# Patient Record
Sex: FEMALE | Race: BLACK OR AFRICAN AMERICAN | NOT HISPANIC OR LATINO | Employment: FULL TIME | URBAN - METROPOLITAN AREA
[De-identification: names, ages, dates, MRNs, and addresses within clinical notes are randomized per-mention and may not be internally consistent; named-entity substitution may affect disease eponyms.]

---

## 2023-05-17 ENCOUNTER — OFFICE VISIT (OUTPATIENT)
Dept: URGENT CARE | Facility: CLINIC | Age: 63
End: 2023-05-17
Payer: MEDICAID

## 2023-05-17 VITALS
TEMPERATURE: 98 F | DIASTOLIC BLOOD PRESSURE: 82 MMHG | SYSTOLIC BLOOD PRESSURE: 138 MMHG | WEIGHT: 197 LBS | HEIGHT: 64 IN | HEART RATE: 65 BPM | BODY MASS INDEX: 33.63 KG/M2 | RESPIRATION RATE: 16 BRPM | OXYGEN SATURATION: 96 %

## 2023-05-17 DIAGNOSIS — S70.362A INSECT BITE OF LEFT THIGH, INITIAL ENCOUNTER: Primary | ICD-10-CM
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
## 2023-05-17 PROBLEM — E78.00 PURE HYPERCHOLESTEROLEMIA: Status: ACTIVE | Noted: 2018-12-11

## 2023-05-17 PROBLEM — K64.8 INTERNAL HEMORRHOIDS: Status: ACTIVE | Noted: 2021-01-14

## 2023-05-17 PROCEDURE — 99203 OFFICE O/P NEW LOW 30 MIN: CPT | Mod: S$GLB,,,

## 2023-05-17 PROCEDURE — 99203 PR OFFICE/OUTPT VISIT, NEW, LEVL III, 30-44 MIN: ICD-10-PCS | Mod: S$GLB,,,

## 2023-05-17 RX ORDER — AMLODIPINE BESYLATE 10 MG/1
TABLET ORAL
COMMUNITY

## 2023-05-17 RX ORDER — HYDROCORTISONE 25 MG/G
CREAM TOPICAL 2 TIMES DAILY
Qty: 3.5 G | Refills: 0 | Status: SHIPPED | OUTPATIENT
Start: 2023-05-17 | End: 2024-01-15

## 2023-05-17 RX ORDER — FAMOTIDINE 20 MG/1
20 TABLET, FILM COATED ORAL 2 TIMES DAILY
Qty: 60 TABLET | Refills: 11 | Status: SHIPPED | OUTPATIENT
Start: 2023-05-17 | End: 2024-01-15 | Stop reason: CLARIF

## 2023-05-17 RX ORDER — SERTRALINE HYDROCHLORIDE 50 MG/1
TABLET, FILM COATED ORAL
COMMUNITY

## 2023-05-17 RX ORDER — METFORMIN HYDROCHLORIDE 500 MG/1
TABLET ORAL
COMMUNITY

## 2023-05-17 RX ORDER — CETIRIZINE HYDROCHLORIDE 10 MG/1
10 TABLET ORAL 2 TIMES DAILY
Qty: 60 TABLET | Refills: 0 | Status: SHIPPED | OUTPATIENT
Start: 2023-05-17 | End: 2024-01-15

## 2023-05-17 NOTE — PROGRESS NOTES
"Subjective:      Patient ID: Rivka Ivy is a 63 y.o. female.    Vitals:  height is 5' 4" (1.626 m) and weight is 89.4 kg (197 lb). Her oral temperature is 98.1 °F (36.7 °C). Her blood pressure is 138/82 and her pulse is 65. Her respiration is 16 and oxygen saturation is 96%.     Chief Complaint: Insect Bite    Patient states that something bit her on mother's day. She states that it is very itchy. She has tried cortisone cream and topical abx ointment with mild relief. She denies fever, chills, CP, SOB, nausea, vomiting.    Insect Bite  This is a new problem. The current episode started in the past 7 days. The problem occurs constantly. The problem has been unchanged. Pertinent negatives include no abdominal pain, chest pain, chills, congestion, coughing, diaphoresis, fatigue, fever, nausea, visual change or vomiting. Treatments tried: anti-itch cream. The treatment provided no relief.     Constitution: Negative for chills, sweating, fatigue and fever.   HENT:  Negative for congestion.    Cardiovascular:  Negative for chest pain and sob on exertion.   Respiratory:  Negative for cough.    Gastrointestinal:  Negative for abdominal pain, nausea, vomiting and diarrhea.   Skin:  Positive for erythema. Negative for hives.   Allergic/Immunologic: Positive for itching. Negative for hives.    Objective:     Physical Exam   Constitutional:  Non-toxic appearance. She does not appear ill. No distress.      Comments:Patient is in no acute distress.   normal  HENT:   Head: Normocephalic.   Pulmonary/Chest: Effort normal. No respiratory distress.         Comments: Patient is in no respiratory distress. No accessory muscle usage. Patient able to speak in complete sentences with ease.    Abdominal: Normal appearance.   Neurological: She is alert.   Skin: Skin is not diaphoretic. erythema         Comments: There is an area of erythema appreciated on the medial aspect of the lower left thigh. There area is slightly warm but without " tenderness to palpation. There is no area of fluctuance, no area of induration, no discharge, no warmth appreciated on exam.     Nursing note and vitals reviewed.    Media Information      Assessment:     1. Insect bite of left thigh, initial encounter      Plan:   Previous notes reviewed.  Vital signs reviewed.  Discussed mild allergic reaction due to insect bite, home care, tx options, and given follow up precautions.  Patient was briefed on my thought process and diagnosis.   Patient involved with the treatment plan and agreed to the plan.  Patient informed on warning signs, patient understood warning signs and to go to urgent care or ER if warning signs appear.    Will continue to treat for localized skin reaction due to insect bite, cellulitis appears to be less likely at this time, will have patient try anti-histamines and topical anti-inflammatory medication to help with symptoms, patient informed that if her symptoms are not improving and/or worsening with these remedies within the next 48-72 hours to return to the urgent care.    Patient Instructions   Please drink plenty of fluids.  Please get plenty of rest.  Please return here or go to the Emergency Department for any concerns or worsening of condition.    Please take the cetrizine (Zyrtec) 10mg two times a day for hives relief.  You can increase your cetrizine (Zyrtec) to 20mg two times a day if the 10mg two times a day is ineffective.  Please take the famotidine (Pepcid) 20mg once a day for hives relief.   It is acceptable to take the Zyrtec and Pepcid, in the event that your medications overlap.     Please apply HYDROCORTISONE cream to the affected area.    Please follow up with your primary care doctor or specialist as needed.    If you  smoke, please stop smoking.     Insect bite of left thigh, initial encounter  -     cetirizine (ZYRTEC) 10 MG tablet; Take 1 tablet (10 mg total) by mouth 2 (two) times a day.  Dispense: 60 tablet; Refill: 0  -      famotidine (PEPCID) 20 MG tablet; Take 1 tablet (20 mg total) by mouth 2 (two) times daily.  Dispense: 60 tablet; Refill: 11  -     hydrocortisone 2.5 % cream; Apply topically 2 (two) times daily.  Dispense: 3.5 g; Refill: 0      Wilma'Jenna Burch PA-C

## 2023-05-17 NOTE — PATIENT INSTRUCTIONS
Please drink plenty of fluids.  Please get plenty of rest.  Please return here or go to the Emergency Department for any concerns or worsening of condition.    Please take the cetrizine (Zyrtec) 10mg two times a day for hives relief.  You can increase your cetrizine (Zyrtec) to 20mg two times a day if the 10mg two times a day is ineffective.  Please take the famotidine (Pepcid) 20mg once a day for hives relief.   It is acceptable to take the Zyrtec and Pepcid, in the event that your medications overlap.     Please apply HYDROCORTISONE cream to the affected area.    Please follow up with your primary care doctor or specialist as needed.    If you  smoke, please stop smoking.

## 2024-01-02 ENCOUNTER — OFFICE VISIT (OUTPATIENT)
Dept: URGENT CARE | Facility: CLINIC | Age: 64
End: 2024-01-02
Payer: MEDICAID

## 2024-01-02 VITALS
OXYGEN SATURATION: 95 % | TEMPERATURE: 99 F | WEIGHT: 197 LBS | DIASTOLIC BLOOD PRESSURE: 75 MMHG | HEART RATE: 67 BPM | SYSTOLIC BLOOD PRESSURE: 114 MMHG | HEIGHT: 64 IN | RESPIRATION RATE: 18 BRPM | BODY MASS INDEX: 33.63 KG/M2

## 2024-01-02 DIAGNOSIS — R05.9 COUGH, UNSPECIFIED TYPE: ICD-10-CM

## 2024-01-02 DIAGNOSIS — J10.1 INFLUENZA A: Primary | ICD-10-CM

## 2024-01-02 LAB
CTP QC/QA: YES
CTP QC/QA: YES
POC MOLECULAR INFLUENZA A AGN: POSITIVE
POC MOLECULAR INFLUENZA B AGN: NEGATIVE
SARS-COV-2 AG RESP QL IA.RAPID: NEGATIVE

## 2024-01-02 PROCEDURE — 87811 SARS-COV-2 COVID19 W/OPTIC: CPT | Mod: QW,S$GLB,,

## 2024-01-02 PROCEDURE — 99213 OFFICE O/P EST LOW 20 MIN: CPT | Mod: S$GLB,,,

## 2024-01-02 PROCEDURE — 87502 INFLUENZA DNA AMP PROBE: CPT | Mod: QW,S$GLB,,

## 2024-01-02 RX ORDER — OSELTAMIVIR PHOSPHATE 75 MG/1
75 CAPSULE ORAL 2 TIMES DAILY
Qty: 10 CAPSULE | Refills: 0 | Status: SHIPPED | OUTPATIENT
Start: 2024-01-02 | End: 2024-01-07

## 2024-01-02 RX ORDER — PROMETHAZINE HYDROCHLORIDE AND DEXTROMETHORPHAN HYDROBROMIDE 6.25; 15 MG/5ML; MG/5ML
5 SYRUP ORAL EVERY 6 HOURS PRN
Qty: 118 ML | Refills: 0 | Status: SHIPPED | OUTPATIENT
Start: 2024-01-02 | End: 2024-01-12

## 2024-01-02 NOTE — PROGRESS NOTES
"Subjective:      Patient ID: Rivka Ivy is a 63 y.o. female.    Vitals:  height is 5' 4" (1.626 m) and weight is 89.4 kg (197 lb). Her oral temperature is 99.3 °F (37.4 °C). Her blood pressure is 114/75 and her pulse is 67. Her respiration is 18 and oxygen saturation is 95%.     Chief Complaint: Cough    Pt is a 62 y/o F who presents with coughing, body aches, headache, fever, chills x3 days. Denies CP, SoB, n/v/d, abdominal pain.    Cough  This is a new problem. Episode onset: 2 days ago. The problem has been unchanged. The problem occurs constantly. The cough is Productive of sputum. Associated symptoms include chills, a fever, headaches, myalgias, nasal congestion and sweats. Pertinent negatives include no chest pain, ear pain, eye redness, rash, sore throat, shortness of breath or wheezing. Nothing aggravates the symptoms. She has tried nothing for the symptoms. The treatment provided no relief.     Constitution: Positive for chills and fever.   HENT:  Positive for congestion. Negative for ear pain and sore throat.    Neck: Negative for neck pain.   Cardiovascular:  Negative for chest pain.   Eyes:  Negative for eye discharge and eye redness.   Respiratory:  Positive for cough. Negative for shortness of breath and wheezing.    Gastrointestinal:  Negative for abdominal pain, nausea, vomiting and diarrhea.   Genitourinary:  Negative for dysuria.   Musculoskeletal:  Positive for muscle ache.   Skin:  Negative for rash.   Allergic/Immunologic: Negative for sneezing.   Neurological:  Positive for headaches. Negative for dizziness.      Objective:     Physical Exam   Constitutional: She is oriented to person, place, and time. She appears well-developed.   HENT:   Head: Normocephalic and atraumatic.   Ears:   Right Ear: External ear normal.   Left Ear: External ear normal.   Nose: Congestion present.   Mouth/Throat: Oropharynx is clear and moist. Mucous membranes are moist. Oropharynx is clear.   Eyes: Conjunctivae, " EOM and lids are normal. Pupils are equal, round, and reactive to light.   Neck: Trachea normal and phonation normal. Neck supple.   Cardiovascular: Normal rate, regular rhythm, normal heart sounds and normal pulses.   Pulmonary/Chest: Effort normal and breath sounds normal. No respiratory distress.   Musculoskeletal: Normal range of motion.         General: Normal range of motion.   Neurological: no focal deficit. She is alert and oriented to person, place, and time.   Skin: Skin is warm, dry and intact. Capillary refill takes less than 2 seconds.   Psychiatric: Her speech is normal and behavior is normal. Judgment and thought content normal.   Nursing note and vitals reviewed.      Results for orders placed or performed in visit on 01/02/24   POCT Influenza A/B MOLECULAR   Result Value Ref Range    POC Molecular Influenza A Ag Positive (A) Negative, Not Reported    POC Molecular Influenza B Ag Negative Negative, Not Reported     Acceptable Yes    SARS Coronavirus 2 Antigen, POCT Manual Read   Result Value Ref Range    SARS Coronavirus 2 Antigen Negative Negative     Acceptable Yes        Assessment:     1. Influenza A    2. Cough, unspecified type        Plan:       Influenza A  -     POCT Influenza A/B MOLECULAR  -     SARS Coronavirus 2 Antigen, POCT Manual Read  -     oseltamivir (TAMIFLU) 75 MG capsule; Take 1 capsule (75 mg total) by mouth 2 (two) times daily. for 5 days  Dispense: 10 capsule; Refill: 0    Cough, unspecified type  -     promethazine-dextromethorphan (PROMETHAZINE-DM) 6.25-15 mg/5 mL Syrp; Take 5 mLs by mouth every 6 (six) hours as needed (cough).  Dispense: 118 mL; Refill: 0                Patient Instructions       FLU  Your Rapid FLU test was POSITIVE FOR INFLUENZA A  If your condition worsens or fails to improve we recommend that you receive another evaluation at the ER immediately or contact your PCP to discuss your concerns or return here. You must  understand that you've received an urgent care treatment only and that you may be released before all your medical problems are known or treated. You the patient will arrange for follouwp care as instructed.   -  Take full course of Tamilfu as prescribed  -  Flonase (fluticasone) is a nasal spray which is available over the counter and may help with your symptoms.   -  Zyrtec D, Claritin D or Allegra D can also help with symptoms of congestion and drainage.   -  If you just have drainage you can take plain Zyrtec, Claritin or Allegra   -  Rest and fluids are also important.   -  Tylenol or ibuprofen can also be used as directed for pain unless you have an allergy to them or medical condition such as stomach ulcers, kidney or liver disease or blood thinners etc for which you should not be taking these type of medications.   - Do not share any utensils or share drinks   - Wash hands frequently

## 2024-01-15 ENCOUNTER — OFFICE VISIT (OUTPATIENT)
Dept: URGENT CARE | Facility: CLINIC | Age: 64
End: 2024-01-15
Payer: MEDICAID

## 2024-01-15 VITALS
WEIGHT: 197 LBS | OXYGEN SATURATION: 95 % | DIASTOLIC BLOOD PRESSURE: 90 MMHG | TEMPERATURE: 98 F | BODY MASS INDEX: 33.63 KG/M2 | HEIGHT: 64 IN | RESPIRATION RATE: 20 BRPM | SYSTOLIC BLOOD PRESSURE: 154 MMHG | HEART RATE: 72 BPM

## 2024-01-15 DIAGNOSIS — J20.9 ACUTE BRONCHITIS WITH WHEEZING: ICD-10-CM

## 2024-01-15 DIAGNOSIS — R07.9 CHEST PAIN, UNSPECIFIED TYPE: Primary | ICD-10-CM

## 2024-01-15 DIAGNOSIS — J18.0 INFLUENZA WITH BRONCHOPNEUMONIA: ICD-10-CM

## 2024-01-15 DIAGNOSIS — J11.08 INFLUENZA WITH BRONCHOPNEUMONIA: ICD-10-CM

## 2024-01-15 PROCEDURE — 93010 ELECTROCARDIOGRAM REPORT: CPT | Mod: S$PBB,,, | Performed by: INTERNAL MEDICINE

## 2024-01-15 PROCEDURE — 93005 ELECTROCARDIOGRAM TRACING: CPT | Mod: S$GLB,,, | Performed by: FAMILY MEDICINE

## 2024-01-15 PROCEDURE — 99214 OFFICE O/P EST MOD 30 MIN: CPT | Mod: 25,S$GLB,, | Performed by: FAMILY MEDICINE

## 2024-01-15 PROCEDURE — 71046 X-RAY EXAM CHEST 2 VIEWS: CPT | Mod: S$GLB,,, | Performed by: RADIOLOGY

## 2024-01-15 PROCEDURE — 94640 AIRWAY INHALATION TREATMENT: CPT | Mod: S$GLB,,, | Performed by: FAMILY MEDICINE

## 2024-01-15 RX ORDER — FLUTICASONE PROPIONATE 50 MCG
1 SPRAY, SUSPENSION (ML) NASAL DAILY
Qty: 9.9 ML | Refills: 0 | Status: SHIPPED | OUTPATIENT
Start: 2024-01-15

## 2024-01-15 RX ORDER — BENAZEPRIL HYDROCHLORIDE 40 MG/1
1 TABLET ORAL DAILY
COMMUNITY

## 2024-01-15 RX ORDER — ALBUTEROL SULFATE 90 UG/1
2 AEROSOL, METERED RESPIRATORY (INHALATION) EVERY 6 HOURS PRN
Qty: 18 G | Refills: 0 | Status: SHIPPED | OUTPATIENT
Start: 2024-01-15 | End: 2025-01-14

## 2024-01-15 RX ORDER — IPRATROPIUM BROMIDE 0.5 MG/2.5ML
0.5 SOLUTION RESPIRATORY (INHALATION)
Status: COMPLETED | OUTPATIENT
Start: 2024-01-15 | End: 2024-01-15

## 2024-01-15 RX ORDER — CETIRIZINE HYDROCHLORIDE 10 MG/1
10 TABLET ORAL DAILY
Qty: 30 TABLET | Refills: 0 | Status: SHIPPED | OUTPATIENT
Start: 2024-01-15 | End: 2024-02-14

## 2024-01-15 RX ORDER — ALBUTEROL SULFATE 0.83 MG/ML
2.5 SOLUTION RESPIRATORY (INHALATION)
Status: COMPLETED | OUTPATIENT
Start: 2024-01-15 | End: 2024-01-15

## 2024-01-15 RX ORDER — PREDNISONE 10 MG/1
10 TABLET ORAL DAILY
Qty: 4 TABLET | Refills: 0 | Status: SHIPPED | OUTPATIENT
Start: 2024-01-15 | End: 2024-01-19

## 2024-01-15 RX ORDER — GUAIFENESIN 600 MG/1
600 TABLET, EXTENDED RELEASE ORAL 2 TIMES DAILY
Qty: 14 TABLET | Refills: 0 | Status: SHIPPED | OUTPATIENT
Start: 2024-01-15 | End: 2024-01-22

## 2024-01-15 RX ORDER — AZITHROMYCIN 250 MG/1
TABLET, FILM COATED ORAL
Qty: 6 TABLET | Refills: 0 | Status: SHIPPED | OUTPATIENT
Start: 2024-01-15 | End: 2024-01-20

## 2024-01-15 RX ORDER — CODEINE PHOSPHATE AND GUAIFENESIN 10; 100 MG/5ML; MG/5ML
5 SOLUTION ORAL NIGHTLY PRN
Qty: 100 ML | Refills: 0 | Status: SHIPPED | OUTPATIENT
Start: 2024-01-15

## 2024-01-15 RX ORDER — ATORVASTATIN CALCIUM 40 MG/1
1 TABLET, FILM COATED ORAL DAILY
COMMUNITY

## 2024-01-15 RX ADMIN — ALBUTEROL SULFATE 2.5 MG: 0.83 SOLUTION RESPIRATORY (INHALATION) at 02:01

## 2024-01-15 RX ADMIN — IPRATROPIUM BROMIDE 0.5 MG: 0.5 SOLUTION RESPIRATORY (INHALATION) at 02:01

## 2024-01-15 NOTE — LETTER
January 15, 2024      [facility]  [address] [facility], [address]  [place] [address]  Phone: [phone]  Fax: [phone]       Patient: Rivka Ivy   YOB: 1960  Date of Visit: 01/15/2024    To Whom It May Concern:    Dimple Ivy  was at [facility] on 01/15/2024. The patient may return to work/school on 01.17.24 with no restrictions. If you have any questions or concerns, or if I can be of further assistance, please do not hesitate to contact me.    Sincerely,    Liz Martin MD

## 2024-01-15 NOTE — PROGRESS NOTES
"Subjective:      Patient ID: Rivka Ivy is a 63 y.o. female.    Vitals:  height is 5' 4" (1.626 m) and weight is 89.4 kg (197 lb). Her oral temperature is 98.3 °F (36.8 °C). Her blood pressure is 154/90 (abnormal) and her pulse is 72. Her respiration is 20 and oxygen saturation is 95%.     Chief Complaint: Cough    Patient is here for cough, wheezing and right side chest pain. Patient diagnosed with the flu 1/2/24. Patient states symptoms are worsening.    Cough  This is a recurrent problem. Episode onset: 13 days ago. The problem has been gradually worsening. The cough is Productive of sputum. Associated symptoms include chest pain and wheezing. Associated symptoms comments: fatigue. The symptoms are aggravated by lying down. She has tried nothing for the symptoms. There is no history of asthma, bronchiectasis, bronchitis, COPD, emphysema, environmental allergies or pneumonia.       Cardiovascular:  Positive for chest pain.   Respiratory:  Positive for cough and wheezing.    Allergic/Immunologic: Negative for environmental allergies.      Objective:     Physical Exam   Constitutional: She is oriented to person, place, and time.   HENT:   Head: Normocephalic.   Ears:   Right Ear: External ear normal.   Left Ear: External ear normal.   Nose: Nose normal.   Mouth/Throat: Mucous membranes are moist.   Eyes: Conjunctivae are normal.   Cardiovascular: Normal rate.   Pulmonary/Chest: Effort normal. She has wheezes. She has rhonchi. She has rales.   Musculoskeletal: Normal range of motion.         General: Normal range of motion.   Neurological: She is alert and oriented to person, place, and time.   Skin: Skin is dry.   Psychiatric: Her behavior is normal.       Assessment:     1. Chest pain, unspecified type    2. Influenza with bronchopneumonia    3. Acute bronchitis with wheezing      Cxr my interp; no PNA  Plan:       Chest pain, unspecified type  -     XR CHEST PA AND LATERAL; Future; Expected date: 01/15/2024  -  "    IN OFFICE EKG 12-LEAD (to Muse)    Influenza with bronchopneumonia  -     azithromycin (Z-CHANDANA) 250 MG tablet; Take 2 tablets by mouth on day 1; Take 1 tablet by mouth on days 2-5  Dispense: 6 tablet; Refill: 0  -     guaiFENesin (MUCINEX) 600 mg 12 hr tablet; Take 1 tablet (600 mg total) by mouth 2 (two) times daily. for 7 days  Dispense: 14 tablet; Refill: 0  -     guaiFENesin-codeine 100-10 mg/5 ml (TUSSI-ORGANIDIN NR)  mg/5 mL syrup; Take 5 mLs by mouth nightly as needed for Cough.  Dispense: 100 mL; Refill: 0    Acute bronchitis with wheezing  -     albuterol nebulizer solution 2.5 mg  -     ipratropium 0.02 % nebulizer solution 0.5 mg  -     predniSONE (DELTASONE) 10 MG tablet; Take 1 tablet (10 mg total) by mouth once daily. for 4 days  Dispense: 4 tablet; Refill: 0  -     albuterol (VENTOLIN HFA) 90 mcg/actuation inhaler; Inhale 2 puffs into the lungs every 6 (six) hours as needed for Wheezing. Rescue  Dispense: 18 g; Refill: 0  -     inhalation spacing device; Use as directed for inhalation.  Dispense: 1 each; Refill: 0  -     cetirizine (ZYRTEC) 10 MG tablet; Take 1 tablet (10 mg total) by mouth once daily.  Dispense: 30 tablet; Refill: 0  -     fluticasone propionate (FLONASE) 50 mcg/actuation nasal spray; 1 spray (50 mcg total) by Each Nostril route once daily.  Dispense: 9.9 mL; Refill: 0    EKG NSR  Wheezing in clinic.   Will treat as above.   Pt aware of codeine in medicine, she gets itchy, and is willing to take it if it helps her cough at night.   Extensive discussion.   RTC PRN

## 2024-03-08 ENCOUNTER — OFFICE VISIT (OUTPATIENT)
Dept: URGENT CARE | Facility: CLINIC | Age: 64
End: 2024-03-08
Payer: MEDICAID

## 2024-03-08 VITALS
RESPIRATION RATE: 16 BRPM | HEART RATE: 93 BPM | DIASTOLIC BLOOD PRESSURE: 84 MMHG | TEMPERATURE: 98 F | WEIGHT: 195 LBS | BODY MASS INDEX: 33.29 KG/M2 | OXYGEN SATURATION: 95 % | SYSTOLIC BLOOD PRESSURE: 143 MMHG | HEIGHT: 64 IN

## 2024-03-08 DIAGNOSIS — S52.502A CLOSED FRACTURE OF DISTAL END OF LEFT RADIUS, UNSPECIFIED FRACTURE MORPHOLOGY, INITIAL ENCOUNTER: Primary | ICD-10-CM
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
## 2024-03-08 PROCEDURE — 99213 OFFICE O/P EST LOW 20 MIN: CPT | Mod: S$GLB,,,

## 2024-03-08 PROCEDURE — 73090 X-RAY EXAM OF FOREARM: CPT | Mod: LT,S$GLB,, | Performed by: RADIOLOGY

## 2024-03-08 PROCEDURE — 73110 X-RAY EXAM OF WRIST: CPT | Mod: LT,S$GLB,, | Performed by: RADIOLOGY

## 2024-03-08 RX ORDER — KETOROLAC TROMETHAMINE 30 MG/ML
30 INJECTION, SOLUTION INTRAMUSCULAR; INTRAVENOUS
Status: COMPLETED | OUTPATIENT
Start: 2024-03-08 | End: 2024-03-08

## 2024-03-08 RX ORDER — TRAMADOL HYDROCHLORIDE 50 MG/1
50 TABLET ORAL EVERY 6 HOURS
Qty: 12 TABLET | Refills: 0 | Status: SHIPPED | OUTPATIENT
Start: 2024-03-08 | End: 2024-03-11

## 2024-03-08 RX ADMIN — KETOROLAC TROMETHAMINE 30 MG: 30 INJECTION, SOLUTION INTRAMUSCULAR; INTRAVENOUS at 02:03

## 2024-03-08 NOTE — PATIENT INSTRUCTIONS
Take Tylenol or ibuprofen for pain if not allergic to.  - tramadol  is a narcotic pain medication. Take only as needed for severe pain.   - Please be aware as we discussed that narcotics can be addictive.   - I have given you a limited quantity to take as it is needed at this time. However take it sparingly and only when needed.    - Do not operate machinery or drive on this medication.     Keep splint on until follow up with orthopedics    Avoid prolonged use of the affected area until better.      Please return or see your primary care doctor if you develop new or worsening symptoms.      You must understand that you've received an Urgent Care treatment only and that you may be released before all of your medical problems are known or treated. You, the patient, will arrange for follow up as instructed. If your symptoms worsen or fail to improve you should go to the Emergency Room.     If you are give a referral to specialist, please conform your appointment by calling [phone].

## 2024-03-08 NOTE — PROGRESS NOTES
"Subjective:      Patient ID: Rivka Ivy is a 63 y.o. female.    Vitals:  height is 5' 4" (1.626 m) and weight is 88.5 kg (195 lb). Her oral temperature is 98.4 °F (36.9 °C). Her blood pressure is 143/84 (abnormal) and her pulse is 93. Her respiration is 16 and oxygen saturation is 95%.     Chief Complaint: Arm Pain    Patient is a 63-year-old female who presents with left forearm and left wrist pain after a fall that occurred yesterday.  She was trying to carry groceries from her car to her house when she tripped on the curb and fell onto her left arm.  She has taken Motrin for the pain.  Denies numbness or tingling.    Arm Pain   The incident occurred 6 to 12 hours ago. The incident occurred at home. The injury mechanism was a fall. The pain is present in the left elbow and left forearm. The quality of the pain is described as aching. The pain does not radiate. The pain is at a severity of 10/10. The pain is severe. The pain has been Constant since the incident. Pertinent negatives include no numbness. Nothing aggravates the symptoms. She has tried ice for the symptoms. The treatment provided no relief.       HENT:  Negative for facial trauma.    Eyes:  Negative for blurred vision.   Gastrointestinal:  Negative for nausea and vomiting.   Musculoskeletal:  Positive for joint pain, joint swelling and abnormal ROM of joint.   Skin:  Negative for wound and abrasion.   Neurological:  Negative for numbness and tingling.      Objective:     Physical Exam   Constitutional: She is oriented to person, place, and time. She appears well-developed.   HENT:   Head: Normocephalic and atraumatic.   Ears:   Right Ear: External ear normal.   Left Ear: External ear normal.   Nose: Nose normal.   Mouth/Throat: Oropharynx is clear and moist.   Eyes: Conjunctivae, EOM and lids are normal.   Neck: Trachea normal and phonation normal. Neck supple.   Musculoskeletal:      Right wrist: Normal.      Left wrist: She exhibits decreased " range of motion, tenderness and swelling.      Right forearm: Normal.      Left forearm: She exhibits tenderness and swelling.   Neurological: She is alert and oriented to person, place, and time.   Skin: Skin is warm, dry and intact.   Psychiatric: Her speech is normal and behavior is normal. Judgment and thought content normal.   Nursing note and vitals reviewed.    X-Ray Wrist Complete 3 views Left    Result Date: 3/8/2024  EXAMINATION: XR WRIST COMPLETE 3 VIEWS LEFT CLINICAL HISTORY: Pain in left arm TECHNIQUE: PA, lateral, and oblique views of the left wrist were performed. COMPARISON: None FINDINGS: There is a nondisplaced vertical fracture identified involving the distal radius.  No other fracture or dislocation.     See above Electronically signed by: Aubrey Mulligan MD Date:    03/08/2024 Time:    14:43    XR FOREARM LEFT    Result Date: 3/8/2024  EXAMINATION: XR FOREARM LEFT CLINICAL HISTORY: Pain in left arm TECHNIQUE: AP and lateral views of the left forearm were performed. COMPARISON: None FINDINGS: There is a nondisplaced distal radius fracture identified.  No other definite fracture or dislocation.  Slight deformity of the radial neck noted however and irregular views of the left elbow recommended for further more definite evaluation     See above Electronically signed by: Aubrey Mulligan MD Date:    03/08/2024 Time:    14:43     Assessment:     1. Closed fracture of distal end of left radius, unspecified fracture morphology, initial encounter    2. Fall, initial encounter        Plan:     Closed fracture of distal end of left radius, unspecified fracture morphology, initial encounter  -     ketorolac injection 30 mg  -     X-Ray Wrist Complete 3 views Left; Future; Expected date: 03/08/2024  -     XR FOREARM LEFT; Future; Expected date: 03/08/2024  -     Splint application; Future  -     traMADoL (ULTRAM) 50 mg tablet; Take 1 tablet (50 mg total) by mouth every 6 (six) hours. for 3 days  Dispense: 12  "tablet; Refill: 0  -     Ambulatory referral/consult to Orthopedics    Fall, initial encounter  -     X-Ray Wrist Complete 3 views Left; Future; Expected date: 03/08/2024  -     XR FOREARM LEFT; Future; Expected date: 03/08/2024    Patient is a 63-year-old female presenting with left wrist and distal forearm pain following a mechanical fall that occurred last night.  She was carrying groceries from her car to her house when she had tripped on the curb and fell on her left arm.  She is taken ibuprofen for pain.  Vital signs are within normal limits.  On exam, there is tenderness and swelling to the left distal forearm and left wrist.  Limited flexion and extension.  X-rays of the left wrist and forearm show a nondisplaced distal radius fracture. "Slight deformity of the radial neck noted however and irregular views of the left elbow recommended for further more definite evaluation." There is no tenderness to the left elbow and patient has full range of motion of left elbow in flexion and extension without any pain.  After initial forearm and wrist x-rays were obtained, x-ray tech is off for lunch.  Patient does not wish to wait.  We can hold off on oral x-ray for now.  Will place patient in a sugar-tong splint.  Toradol IM given in clinic with some relief of pain.  Discussed with patient that she can take ibuprofen at home.  Will also send tramadol.  Patient has listed allergy to codeine.  States that she gets itching, but she can take codeine with an antihistamine.  Referral orthopedics placed.          Patient Instructions     Take Tylenol or ibuprofen for pain if not allergic to.  - tramadol  is a narcotic pain medication. Take only as needed for severe pain.   - Please be aware as we discussed that narcotics can be addictive.   - I have given you a limited quantity to take as it is needed at this time. However take it sparingly and only when needed.    - Do not operate machinery or drive on this medication. "     Keep splint on until follow up with orthopedics    Avoid prolonged use of the affected area until better.      Please return or see your primary care doctor if you develop new or worsening symptoms.      You must understand that you've received an Urgent Care treatment only and that you may be released before all of your medical problems are known or treated. You, the patient, will arrange for follow up as instructed. If your symptoms worsen or fail to improve you should go to the Emergency Room.     If you are give a referral to specialist, please conform your appointment by calling [phone].

## 2024-03-11 ENCOUNTER — TELEPHONE (OUTPATIENT)
Dept: ORTHOPEDICS | Facility: CLINIC | Age: 64
End: 2024-03-11
Payer: MEDICAID

## 2024-03-11 NOTE — TELEPHONE ENCOUNTER
Spoke with pt. Informed pt that there are currently no new Medicaid slots open at this time. Gave pt the number to [facility] & [facility]. ----- Message from Melissa Ivy MA sent at 3/11/2024  9:53 AM CDT -----  Regarding: Asap appt  Good morning,    My name is Melissa Martinbard and I'm the clinic Supervisor at the [facility] in [facility]. I was hoping I could get some assistance with getting my mom an appt as soon as possible. She fell on her wrist on 03.07.2024, she went to urgent care the next morning and was informed that the wrist was fractured. She was given a referral for Ortho and was told this morning that because of her insurance (medicaid) she's not able to get an appt until July. I don't think she can wait until July with a fractured wrist. She was told to go to the ER to see someone sooner. Can someone please give me a call to discuss getting her a sooner appt than July. Thanks so much    Melissa Ivy  [phone]

## 2024-03-12 ENCOUNTER — TELEPHONE (OUTPATIENT)
Dept: ORTHOPEDICS | Facility: CLINIC | Age: 64
End: 2024-03-12
Payer: MEDICAID

## 2024-03-12 ENCOUNTER — PATIENT MESSAGE (OUTPATIENT)
Dept: ORTHOPEDICS | Facility: CLINIC | Age: 64
End: 2024-03-12
Payer: MEDICAID

## 2024-03-12 DIAGNOSIS — M25.539 PAIN IN WRIST, UNSPECIFIED LATERALITY: Primary | ICD-10-CM

## 2024-03-12 DIAGNOSIS — M25.529 ELBOW PAIN, UNSPECIFIED LATERALITY: ICD-10-CM

## 2024-03-12 NOTE — TELEPHONE ENCOUNTER
----- Message from Bella Hayes sent at 3/12/2024  3:57 PM CDT -----  Regarding: pt advice  Contact: [phone]  Pt returning miss call from nurse Arias. Pls call

## 2024-03-13 ENCOUNTER — OFFICE VISIT (OUTPATIENT)
Dept: ORTHOPEDICS | Facility: CLINIC | Age: 64
End: 2024-03-13
Payer: MEDICAID

## 2024-03-13 VITALS
BODY MASS INDEX: 34.13 KG/M2 | HEART RATE: 62 BPM | DIASTOLIC BLOOD PRESSURE: 78 MMHG | SYSTOLIC BLOOD PRESSURE: 179 MMHG | WEIGHT: 199.94 LBS | HEIGHT: 64 IN

## 2024-03-13 DIAGNOSIS — S52.502A NONDISPLACED FRACTURE OF DISTAL END OF LEFT RADIUS: Primary | ICD-10-CM

## 2024-03-13 PROCEDURE — 3078F DIAST BP <80 MM HG: CPT | Mod: CPTII,,,

## 2024-03-13 PROCEDURE — 99213 OFFICE O/P EST LOW 20 MIN: CPT | Mod: PBBFAC,PN

## 2024-03-13 PROCEDURE — 1160F RVW MEDS BY RX/DR IN RCRD: CPT | Mod: CPTII,,,

## 2024-03-13 PROCEDURE — 99999 PR PBB SHADOW E&M-EST. PATIENT-LVL III: CPT | Mod: PBBFAC,,,

## 2024-03-13 PROCEDURE — 99214 OFFICE O/P EST MOD 30 MIN: CPT | Mod: S$PBB,,,

## 2024-03-13 PROCEDURE — 1159F MED LIST DOCD IN RCRD: CPT | Mod: CPTII,,,

## 2024-03-13 PROCEDURE — 3077F SYST BP >= 140 MM HG: CPT | Mod: CPTII,,,

## 2024-03-13 PROCEDURE — 3008F BODY MASS INDEX DOCD: CPT | Mod: CPTII,,,

## 2024-03-13 PROCEDURE — 4010F ACE/ARB THERAPY RXD/TAKEN: CPT | Mod: CPTII,,,

## 2024-03-13 RX ORDER — OXYCODONE AND ACETAMINOPHEN 5; 325 MG/1; MG/1
1 TABLET ORAL EVERY 4 HOURS PRN
Qty: 28 EACH | Refills: 0 | Status: SHIPPED | OUTPATIENT
Start: 2024-03-13 | End: 2024-03-27

## 2024-03-13 NOTE — PROGRESS NOTES
SUBJECTIVE:      Chief Complaint: left wrist fracture    History of Present Illness:  Patient is a 63 y.o. right hand dominant female who presents today with complaints of left wrist pain. Patient reports she missed a step and had FOOSH type fall 6 days prior. She went to urgent care and was found to have a nondisplaced left distal radius fracture and placed in long arm splint. Here today for follow up. Denies any numbness ot tinging. Pain around wrist.  She does have a codeine allergy and has been taking tramadol which causes itching.  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    The patient is a/an .    Onset of symptoms/DOI was 6 days prior.    Symptoms are aggravated by movement.    Symptoms are alleviated by rest.    Symptoms consist of pain and swelling.    The patient rates their pain as a 6/10.    Attempted treatment(s) and/or interventions include activity modifications, rest, immobilization.     The patient denies any fevers, chills, N/V, D/C and presents for evaluation.       Past Medical History:   Diagnosis Date    ALLERGIC RHINITIS     Benign hypertension     GERD (gastroesophageal reflux disease)     History of ectopic pregnancy     History of positive PPD, untreated     History of positive PPD, untreated     Hyperlipidemia     Iron deficiency     MRSA (methicillin resistant Staphylococcus aureus)     abscess 2207 treated    Multiple thyroid nodules     Obesity     Vitamin D deficiency disease      Past Surgical History:   Procedure Laterality Date    HYSTERECTOMY      salpingectomy for tubal pregnancy      right    TUBAL LIGATION       Review of patient's allergies indicates:   Allergen Reactions    Codeine      Other reaction(s): Itching     Social History     Social History Narrative    Not on file     Family History   Problem Relation Age of Onset    Hypertension Mother     Heart disease Father      Hypertension Father     Cancer Brother         gastric    Hypertension Brother     Diabetes Brother     Hypertension Brother          Current Outpatient Medications:     albuterol (VENTOLIN HFA) 90 mcg/actuation inhaler, Inhale 2 puffs into the lungs every 6 (six) hours as needed for Wheezing. Rescue, Disp: 18 g, Rfl: 0    amLODIPine (NORVASC) 10 MG tablet, amlodipine 10 mg tablet, Disp: , Rfl:     atorvastatin (LIPITOR) 40 MG tablet, Take 1 tablet by mouth once daily., Disp: , Rfl:     benazepriL (LOTENSIN) 40 MG tablet, Take 1 tablet by mouth once daily., Disp: , Rfl:     cetirizine (ZYRTEC) 10 MG tablet, Take 1 tablet (10 mg total) by mouth once daily., Disp: 30 tablet, Rfl: 0    fluticasone propionate (FLONASE) 50 mcg/actuation nasal spray, 1 spray (50 mcg total) by Each Nostril route once daily., Disp: 9.9 mL, Rfl: 0    guaiFENesin-codeine 100-10 mg/5 ml (TUSSI-ORGANIDIN NR)  mg/5 mL syrup, Take 5 mLs by mouth nightly as needed for Cough. (Patient not taking: Reported on 3/8/2024), Disp: 100 mL, Rfl: 0    inhalation spacing device, Use as directed for inhalation. (Patient not taking: Reported on 3/8/2024), Disp: 1 each, Rfl: 0    metFORMIN (GLUCOPHAGE) 500 MG tablet, metformin 500 mg tablet  TAKE ONE TABLET BY MOUTH TWICE DAILY, Disp: , Rfl:     sertraline (ZOLOFT) 100 MG tablet, TAKE TWO TABLETS BY MOUTH EVERY EVENING (Patient not taking: Reported on 3/8/2024), Disp: 180 tablet, Rfl: 3    sertraline (ZOLOFT) 50 MG tablet, sertraline 50 mg tablet  TAKE ONE TABLET BY MOUTH ONCE DAILY, Disp: , Rfl:     SODIUM CHLORIDE/SODIUM BICARB (NEILMED NASAFLO NASL), , Disp: , Rfl:       Review of Systems:  Constitutional: no fever or chills  Eyes: no visual changes  ENT: no nasal congestion or sore throat  Respiratory: no cough or shortness of breath  Cardiovascular: no chest pain  Gastrointestinal: no nausea or vomiting, tolerating diet  Musculoskeletal: pain and soreness    OBJECTIVE:      Vital Signs (Most  Recent):  There were no vitals filed for this visit.  There is no height or weight on file to calculate BMI.      Physical Exam:  Constitutional: The patient appears well-developed and well-nourished. No distress.   Skin: No lesions appreciated  Head: Normocephalic and atraumatic.   Nose: Nose normal.   Ears: No deformities seen  Eyes: Conjunctivae and EOM are normal.   Neck: No tracheal deviation present.   Cardiovascular: Normal rate and intact distal pulses.    Pulmonary/Chest: Effort normal. No respiratory distress.   Abdominal: There is no guarding.   Neurological: The patient is alert.   Psychiatric: The patient has a normal mood and affect.     Left Hand/Wrist Examination:    Observation/Inspection:  Swelling  Moderate     Deformity  none  Discoloration  none     Scars   none    Atrophy  None  Ecchymosis  + volar wrist    HAND/WRIST EXAMINATION:  Tenderness to palpation at DRUJ you and distal radius.  No tenderness at radial styloid.    Neurovascular Exam:  Digits WWP, brisk CR < 3s throughout  NVI motor/LTS to M/R/U nerves, radial pulse 2+  AIN Intact (O-Delores), PIN Intact (Thumbs Up), Ulnar Intact (scissors)    ROM hand full, painless    ROM elbow full, painless    Abdomen not guarded  Respirations nonlabored  Perfusion intact    Diagnostic Results:     Imaging - I independently viewed the patient's imaging as well as the radiology report.  Xrays of the patient's left wrist demonstrate acute nondisplaced transverse fracture of the distal radius.    X-ray left elbow reviewed by me which shows no evidence of acute fracture or dislocation.    ASSESSMENT/PLAN:      1. Nondisplaced fracture of distal end of left radius              I made the decision to obtain old records of the patient including previous notes and imaging. If new imaging was ordered today of the extremity or extremities evaluated. I independently reviewed and interpreted the xray as well as prior imaging. Reviewed imaging in detail with patient.      We discussed at length different treatment options including conservative vs surgical management. These include anti-inflammatories, acetaminophen, rest, ice, heat, formal physical therapy including strengthening and stretching exercises, home exercise programs, injections, and finally surgical intervention.      Discussed with patient and daughter-in-law that she has a nondisplaced fracture of her left distal radius.  Discussed with Dr. Rausch who believes this can be treated nonoperatively with close observation.  [id number]- Kaya Givens PA-C, applied short arm orthoglass splint. The patient demonstrated understanding and proper care. This was performed for 10 minutes. Activity modification- avoid use of affected limb, elevate above the heart when possible. Other cast care instructions given today.  Percocet Rx sent, instructed to contact clinic if patient develops allergy  Rice treatment.  Possibly convert to wrist brace in 2 weeks, discussed not holding anything heavy and would recommend postponing job until 8 weeks    Follow up: 2 weeks  Xrays needed:  Left wrist in splint     All of the patient's questions were answered and the patient will contact us if they have any questions or concerns in the interim.

## 2024-03-13 NOTE — LETTER
March 13, 2024    Rivka Ivy  [address] [facility] [place] [address]             [facility]  [facility]  [address] [place] [address]  Phone: [phone]  Fax: [phone]   March 13, 2024     Patient: Rivka Ivy   YOB: 1960   Date of Visit: 3/13/2024       To Whom it May Concern:    Rivka Ivy was seen in my clinic on 3/13/2024. She has a left wrist fracture that will require treatment and not holding anything heavy (I.e. trays and plates of food) for 8 weeks.    Please excuse her from any classes or work missed.    If you have any questions or concerns, please don't hesitate to call.    Sincerely,         Kaya Givens PA-C

## 2024-03-27 ENCOUNTER — TELEPHONE (OUTPATIENT)
Dept: ORTHOPEDICS | Facility: CLINIC | Age: 64
End: 2024-03-27

## 2024-03-27 ENCOUNTER — OFFICE VISIT (OUTPATIENT)
Dept: ORTHOPEDICS | Facility: CLINIC | Age: 64
End: 2024-03-27
Payer: MEDICAID

## 2024-03-27 VITALS
DIASTOLIC BLOOD PRESSURE: 88 MMHG | BODY MASS INDEX: 34.2 KG/M2 | SYSTOLIC BLOOD PRESSURE: 159 MMHG | HEART RATE: 69 BPM | HEIGHT: 64 IN | WEIGHT: 200.31 LBS

## 2024-03-27 DIAGNOSIS — S52.502A NONDISPLACED FRACTURE OF DISTAL END OF LEFT RADIUS: Primary | ICD-10-CM

## 2024-03-27 PROCEDURE — 3008F BODY MASS INDEX DOCD: CPT | Mod: CPTII,,,

## 2024-03-27 PROCEDURE — 99999 PR PBB SHADOW E&M-EST. PATIENT-LVL III: CPT | Mod: PBBFAC,,,

## 2024-03-27 PROCEDURE — 1159F MED LIST DOCD IN RCRD: CPT | Mod: CPTII,,,

## 2024-03-27 PROCEDURE — 3077F SYST BP >= 140 MM HG: CPT | Mod: CPTII,,,

## 2024-03-27 PROCEDURE — 3079F DIAST BP 80-89 MM HG: CPT | Mod: CPTII,,,

## 2024-03-27 PROCEDURE — 99213 OFFICE O/P EST LOW 20 MIN: CPT | Mod: PBBFAC,PN

## 2024-03-27 PROCEDURE — 4010F ACE/ARB THERAPY RXD/TAKEN: CPT | Mod: CPTII,,,

## 2024-03-27 PROCEDURE — 99214 OFFICE O/P EST MOD 30 MIN: CPT | Mod: S$PBB,,,

## 2024-03-27 RX ORDER — OXYCODONE AND ACETAMINOPHEN 5; 325 MG/1; MG/1
1 TABLET ORAL EVERY 4 HOURS PRN
Qty: 28 EACH | Refills: 0 | Status: SHIPPED | OUTPATIENT
Start: 2024-03-27

## 2024-03-27 NOTE — TELEPHONE ENCOUNTER
Spoke c patient regarding CT results. Dr. Rausch viewed, okay to cont non-op.  She was instructed to remain in the brace 24/7 unless bathing.  We will see her back in 2 weeks with repeat x-rays.  Patient verbalized understanding.

## 2024-03-27 NOTE — PROGRESS NOTES
SUBJECTIVE:      Chief Complaint: left wrist fracture    History of Present Illness:  Patient is a 63 y.o. right hand dominant female who presents today with complaints of left wrist pain. Patient reports she missed a step and had FOOSH type fall 6 days prior. She went to urgent care and was found to have a nondisplaced left distal radius fracture and placed in long arm splint. Here today for follow up. Denies any numbness ot tinging. Pain around wrist.  She does have a codeine allergy and has been taking tramadol which causes itching.  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    The patient is a/an .    Onset of symptoms/DOI was 6 days prior.    Symptoms are aggravated by movement.    Symptoms are alleviated by rest.    Symptoms consist of pain and swelling.    The patient rates their pain as a 6/10.    Attempted treatment(s) and/or interventions include activity modifications, rest, immobilization.     The patient denies any fevers, chills, N/V, D/C and presents for evaluation.    ____________________________________________________________________    Interval history 3/27/2024 : Patient returns today for follow up of left distal radius fracture.  She was unable to  Percocet from [facility].  She endorses radial wrist pain.  She has been in short-arm splint.  Denies numbness, tingling or radiation.         Past Medical History:   Diagnosis Date    ALLERGIC RHINITIS     Benign hypertension     GERD (gastroesophageal reflux disease)     History of ectopic pregnancy     History of positive PPD, untreated     History of positive PPD, untreated     Hyperlipidemia     Iron deficiency     MRSA (methicillin resistant Staphylococcus aureus)     abscess 2207 treated    Multiple thyroid nodules     Obesity     Vitamin D deficiency disease      Past Surgical History:   Procedure Laterality Date    HYSTERECTOMY       salpingectomy for tubal pregnancy      right    TUBAL LIGATION       Review of patient's allergies indicates:   Allergen Reactions    Codeine      Other reaction(s): Itching     Social History     Social History Narrative    Not on file     Family History   Problem Relation Age of Onset    Hypertension Mother     Heart disease Father     Hypertension Father     Cancer Brother         gastric    Hypertension Brother     Diabetes Brother     Hypertension Brother          Current Outpatient Medications:     albuterol (VENTOLIN HFA) 90 mcg/actuation inhaler, Inhale 2 puffs into the lungs every 6 (six) hours as needed for Wheezing. Rescue, Disp: 18 g, Rfl: 0    amLODIPine (NORVASC) 10 MG tablet, amlodipine 10 mg tablet, Disp: , Rfl:     atorvastatin (LIPITOR) 40 MG tablet, Take 1 tablet by mouth once daily., Disp: , Rfl:     benazepriL (LOTENSIN) 40 MG tablet, Take 1 tablet by mouth once daily., Disp: , Rfl:     fluticasone propionate (FLONASE) 50 mcg/actuation nasal spray, 1 spray (50 mcg total) by Each Nostril route once daily., Disp: 9.9 mL, Rfl: 0    metFORMIN (GLUCOPHAGE) 500 MG tablet, metformin 500 mg tablet  TAKE ONE TABLET BY MOUTH TWICE DAILY, Disp: , Rfl:     sertraline (ZOLOFT) 100 MG tablet, TAKE TWO TABLETS BY MOUTH EVERY EVENING, Disp: 180 tablet, Rfl: 3    sertraline (ZOLOFT) 50 MG tablet, sertraline 50 mg tablet  TAKE ONE TABLET BY MOUTH ONCE DAILY, Disp: , Rfl:     SODIUM CHLORIDE/SODIUM BICARB (NEILMED NASAFLO NASL), , Disp: , Rfl:     cetirizine (ZYRTEC) 10 MG tablet, Take 1 tablet (10 mg total) by mouth once daily., Disp: 30 tablet, Rfl: 0    oxyCODONE-acetaminophen (PERCOCET) 5-325 mg per tablet, Take 1 tablet by mouth every 4 (four) hours as needed., Disp: 28 each, Rfl: 0      Review of Systems:  Constitutional: no fever or chills  Eyes: no visual changes  ENT: no nasal congestion or sore throat  Respiratory: no cough or shortness of breath  Cardiovascular: no chest pain  Gastrointestinal: no  "nausea or vomiting, tolerating diet  Musculoskeletal: pain and soreness    OBJECTIVE:      Vital Signs (Most Recent):  Vitals:    03/27/24 0918   BP: (!) 159/88   Pulse: 69   Weight: 90.9 kg (200 lb 4.6 oz)   Height: 5' 4" (1.626 m)     Body mass index is 34.38 kg/m².      Physical Exam:  Constitutional: The patient appears well-developed and well-nourished. No distress.   Skin: No lesions appreciated  Head: Normocephalic and atraumatic.   Nose: Nose normal.   Ears: No deformities seen  Eyes: Conjunctivae and EOM are normal.   Neck: No tracheal deviation present.   Cardiovascular: Normal rate and intact distal pulses.    Pulmonary/Chest: Effort normal. No respiratory distress.   Abdominal: There is no guarding.   Neurological: The patient is alert.   Psychiatric: The patient has a normal mood and affect.     Left Hand/Wrist Examination:    Observation/Inspection:  Swelling  Mild    Deformity  none  Discoloration  none     Scars   none    Atrophy  None  Ecchymosis  resolved    HAND/WRIST EXAMINATION:  Tenderness to palpation at DRUJ you and distal radius.  No tenderness at radial styloid.  Full range of motion of hand and elbow.    Neurovascular Exam:  Digits WWP, brisk CR < 3s throughout  NVI motor/LTS to M/R/U nerves, radial pulse 2+  AIN Intact (O-Delores), PIN Intact (Thumbs Up), Ulnar Intact (scissors)    ROM hand full, painless    ROM elbow full, painless    Abdomen not guarded  Respirations nonlabored  Perfusion intact    Diagnostic Results:     Imaging - I independently viewed the patient's imaging as well as the radiology report.  Xrays of the patient's left wrist demonstrate acute nondisplaced transverse fracture of the distal radius.    X-ray left elbow reviewed by me which shows no evidence of acute fracture or dislocation.    Xray left wrist 3/27/2024 reviewed by me which shows acute nondisplaced fracture of distal radius with intra-articular extension    ASSESSMENT/PLAN:      1. Nondisplaced fracture of " distal end of left radius  CT Wrist Without Contrast Left          Patient returns today for follow up left distal radius fracture.  She has been treated in a splint.  This does seem to have more of an intra-articular involvement today.  We will proceed with stat CT of the left wrist to evaluate intra-articular component and possible need for surgical intervention.  CT order placed.  She was given a wrist brace to wear 24/7 unless bathing which she agrees to be in compliance with. NWB. We will see her back in 1-2 weeks, pending CT results.    Percocet refill sent    Follow up: pending CT  Xrays needed:  Left wrist in splint     All of the patient's questions were answered and the patient will contact us if they have any questions or concerns in the interim.

## 2024-04-30 ENCOUNTER — OFFICE VISIT (OUTPATIENT)
Dept: ORTHOPEDICS | Facility: CLINIC | Age: 64
End: 2024-04-30
Payer: MEDICAID

## 2024-04-30 VITALS
HEART RATE: 66 BPM | HEIGHT: 64 IN | DIASTOLIC BLOOD PRESSURE: 81 MMHG | BODY MASS INDEX: 33.86 KG/M2 | SYSTOLIC BLOOD PRESSURE: 143 MMHG | WEIGHT: 198.31 LBS

## 2024-04-30 DIAGNOSIS — S52.502A NONDISPLACED FRACTURE OF DISTAL END OF LEFT RADIUS: Primary | ICD-10-CM

## 2024-04-30 PROCEDURE — 99213 OFFICE O/P EST LOW 20 MIN: CPT | Mod: S$PBB,,,

## 2024-04-30 PROCEDURE — 1160F RVW MEDS BY RX/DR IN RCRD: CPT | Mod: CPTII,,,

## 2024-04-30 PROCEDURE — 99215 OFFICE O/P EST HI 40 MIN: CPT | Mod: PBBFAC,PN

## 2024-04-30 PROCEDURE — 3008F BODY MASS INDEX DOCD: CPT | Mod: CPTII,,,

## 2024-04-30 PROCEDURE — 4010F ACE/ARB THERAPY RXD/TAKEN: CPT | Mod: CPTII,,,

## 2024-04-30 PROCEDURE — 99999 PR PBB SHADOW E&M-EST. PATIENT-LVL V: CPT | Mod: PBBFAC,,,

## 2024-04-30 PROCEDURE — 3077F SYST BP >= 140 MM HG: CPT | Mod: CPTII,,,

## 2024-04-30 PROCEDURE — 3079F DIAST BP 80-89 MM HG: CPT | Mod: CPTII,,,

## 2024-04-30 PROCEDURE — 1159F MED LIST DOCD IN RCRD: CPT | Mod: CPTII,,,

## 2024-04-30 RX ORDER — METHYLPREDNISOLONE 4 MG/1
TABLET ORAL
Qty: 21 EACH | Refills: 0 | Status: SHIPPED | OUTPATIENT
Start: 2024-04-30

## 2024-04-30 RX ORDER — NAPROXEN 500 MG/1
500 TABLET ORAL 2 TIMES DAILY
Qty: 60 TABLET | Refills: 1 | Status: SHIPPED | OUTPATIENT
Start: 2024-04-30

## 2024-04-30 NOTE — PROGRESS NOTES
SUBJECTIVE:      Chief Complaint: left wrist fracture    History of Present Illness:  Patient is a 64 y.o. right hand dominant female who presents today with complaints of left wrist pain. Patient reports she missed a step and had FOOSH type fall 6 days prior. She went to urgent care and was found to have a nondisplaced left distal radius fracture and placed in long arm splint. Here today for follow up. Denies any numbness ot tinging. Pain around wrist.  She does have a codeine allergy and has been taking tramadol which causes itching.  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    The patient is a/an .    Onset of symptoms/DOI was 6 days prior.    Symptoms are aggravated by movement.    Symptoms are alleviated by rest.    Symptoms consist of pain and swelling.    The patient rates their pain as a 6/10.    Attempted treatment(s) and/or interventions include activity modifications, rest, immobilization.     The patient denies any fevers, chills, N/V, D/C and presents for evaluation.    ____________________________________________________________________    Interval history 3/27/2024 : Patient returns today for follow up of left distal radius fracture.  She was unable to  Percocet from [facility].  She endorses radial wrist pain.  She has been in short-arm splint.  Denies numbness, tingling or radiation.    ____________________________________________________________________    Interval history 4/30/2024 : Patient returns today for follow up of left distal radius fracture.  Overall doing well. Mostly having pain at night.    Past Medical History:   Diagnosis Date    ALLERGIC RHINITIS     Benign hypertension     GERD (gastroesophageal reflux disease)     History of ectopic pregnancy     History of positive PPD, untreated     History of positive PPD, untreated     Hyperlipidemia     Iron deficiency     MRSA (methicillin  resistant Staphylococcus aureus)     abscess 2207 treated    Multiple thyroid nodules     Obesity     Vitamin D deficiency disease      Past Surgical History:   Procedure Laterality Date    HYSTERECTOMY      salpingectomy for tubal pregnancy      right    TUBAL LIGATION       Review of patient's allergies indicates:   Allergen Reactions    Codeine      Other reaction(s): Itching     Social History     Social History Narrative    Not on file     Family History   Problem Relation Name Age of Onset    Hypertension Mother      Heart disease Father      Hypertension Father      Cancer Brother          gastric    Hypertension Brother      Diabetes Brother      Hypertension Brother           Current Outpatient Medications:     albuterol (VENTOLIN HFA) 90 mcg/actuation inhaler, Inhale 2 puffs into the lungs every 6 (six) hours as needed for Wheezing. Rescue, Disp: 18 g, Rfl: 0    amLODIPine (NORVASC) 10 MG tablet, amlodipine 10 mg tablet, Disp: , Rfl:     atorvastatin (LIPITOR) 40 MG tablet, Take 1 tablet by mouth once daily., Disp: , Rfl:     benazepriL (LOTENSIN) 40 MG tablet, Take 1 tablet by mouth once daily., Disp: , Rfl:     fluticasone propionate (FLONASE) 50 mcg/actuation nasal spray, 1 spray (50 mcg total) by Each Nostril route once daily., Disp: 9.9 mL, Rfl: 0    metFORMIN (GLUCOPHAGE) 500 MG tablet, metformin 500 mg tablet  TAKE ONE TABLET BY MOUTH TWICE DAILY, Disp: , Rfl:     oxyCODONE-acetaminophen (PERCOCET) 5-325 mg per tablet, Take 1 tablet by mouth every 4 (four) hours as needed., Disp: 28 each, Rfl: 0    sertraline (ZOLOFT) 100 MG tablet, TAKE TWO TABLETS BY MOUTH EVERY EVENING, Disp: 180 tablet, Rfl: 3    sertraline (ZOLOFT) 50 MG tablet, sertraline 50 mg tablet  TAKE ONE TABLET BY MOUTH ONCE DAILY, Disp: , Rfl:     SODIUM CHLORIDE/SODIUM BICARB (NEILMED NASAFLO NASL), , Disp: , Rfl:     cetirizine (ZYRTEC) 10 MG tablet, Take 1 tablet (10 mg total) by mouth once daily., Disp: 30 tablet, Rfl: 0     "methylPREDNISolone (MEDROL DOSEPACK) 4 mg tablet, use as directed, Disp: 21 each, Rfl: 0    naproxen (NAPROSYN) 500 MG tablet, Take 1 tablet (500 mg total) by mouth 2 (two) times daily., Disp: 60 tablet, Rfl: 1      Review of Systems:  Constitutional: no fever or chills  Eyes: no visual changes  ENT: no nasal congestion or sore throat  Respiratory: no cough or shortness of breath  Cardiovascular: no chest pain  Gastrointestinal: no nausea or vomiting, tolerating diet  Musculoskeletal: pain and soreness    OBJECTIVE:      Vital Signs (Most Recent):  Vitals:    04/30/24 1103   BP: (!) 143/81   Pulse: 66   Weight: 89.9 kg (198 lb 4.9 oz)   Height: 5' 4" (1.626 m)       Body mass index is 34.04 kg/m².      Physical Exam:  Constitutional: The patient appears well-developed and well-nourished. No distress.   Skin: No lesions appreciated  Head: Normocephalic and atraumatic.   Nose: Nose normal.   Ears: No deformities seen  Eyes: Conjunctivae and EOM are normal.   Neck: No tracheal deviation present.   Cardiovascular: Normal rate and intact distal pulses.    Pulmonary/Chest: Effort normal. No respiratory distress.   Abdominal: There is no guarding.   Neurological: The patient is alert.   Psychiatric: The patient has a normal mood and affect.     Left Hand/Wrist Examination:    Observation/Inspection:  Swelling  none    Deformity  none  Discoloration  none     Scars   none    Atrophy  None  Ecchymosis  resolved    HAND/WRIST EXAMINATION:  Tenderness to palpation at DRUJ you and distal radius.  No tenderness at radial styloid.  Full range of motion of hand and elbow.    Neurovascular Exam:  Digits WWP, brisk CR < 3s throughout  NVI motor/LTS to M/R/U nerves, radial pulse 2+  AIN Intact (O-Delores), PIN Intact (Thumbs Up), Ulnar Intact (scissors)    ROM hand full, painless    ROM elbow full, painless    Abdomen not guarded  Respirations nonlabored  Perfusion intact    Diagnostic Results:     Imaging - I independently viewed the " patient's imaging as well as the radiology report.  Xrays of the patient's left wrist demonstrate acute nondisplaced transverse fracture of the distal radius.    X-ray left elbow reviewed by me which shows no evidence of acute fracture or dislocation.    Xray left wrist 3/27/2024 reviewed by me which shows acute nondisplaced fracture of distal radius with intra-articular extension    CT left wrist: Comminuted intra-articular mildly displaced/depressed left distal radius fracture.     ASSESSMENT/PLAN:      1. Nondisplaced fracture of distal end of left radius  X-Ray Wrist Complete 3 views Left    methylPREDNISolone (MEDROL DOSEPACK) 4 mg tablet    Ambulatory referral/consult to Physical/Occupational Therapy    naproxen (NAPROSYN) 500 MG tablet        Patient returns today for follow up of left wrist fracture.  At previous visit she did obtain a CT.  I discussed with Dr. Rausch who recommended continued nonoperative treatment.  She returns today for follow up.  Discussed weaning out of the brace as tolerated and round of formal physical therapy.  PT referral placed, patient responsible to establish and continue care.  Medrol Dosepak sent, take as instructed.  Naproxen b.i.d., take with food, avoid other NSAIDs.  Continue rice treatment.  No holding anything greater than a cup of coffee.  Follow up 4 weeks with repeat imaging.    Follow up: 4 weeks  Xrays needed: left wrist     All of the patient's questions were answered and the patient will contact us if they have any questions or concerns in the interim.

## 2024-05-06 ENCOUNTER — CLINICAL SUPPORT (OUTPATIENT)
Dept: REHABILITATION | Facility: HOSPITAL | Age: 64
End: 2024-05-06
Payer: MEDICAID

## 2024-05-06 DIAGNOSIS — Z78.9 IMPAIRED INSTRUMENTAL ACTIVITIES OF DAILY LIVING (IADL): ICD-10-CM

## 2024-05-06 DIAGNOSIS — M25.632 DECREASED RANGE OF MOTION OF LEFT WRIST: ICD-10-CM

## 2024-05-06 DIAGNOSIS — S52.502A NONDISPLACED FRACTURE OF DISTAL END OF LEFT RADIUS: Primary | ICD-10-CM

## 2024-05-06 DIAGNOSIS — R29.898 DECREASED STRENGTH OF UPPER EXTREMITY: ICD-10-CM

## 2024-05-06 PROCEDURE — 97165 OT EVAL LOW COMPLEX 30 MIN: CPT | Mod: PN

## 2024-05-06 PROCEDURE — 97530 THERAPEUTIC ACTIVITIES: CPT | Mod: PN

## 2024-05-06 NOTE — PLAN OF CARE
"  [facility]  Occupational Therapy Initial Evaluation     Name: Rivka Ivy  Clinic Number: [id number]    Therapy Diagnosis:   Encounter Diagnosis   Name Primary?    Nondisplaced fracture of distal end of left radius Yes     Physician: Kaya Givens, *    Physician Orders: Eval and Treat  Medical Diagnosis: S52.502A (ICD-10-CM) - Nondisplaced fracture of distal end of left radius   Date/Mechanism of Injury: FOOSH injury, 3/7/24  Evaluation Date: 5/6/2024  Insurance Authorization Period Expiration: TBD  Plan of Care Certification Period: 5/6/24-8/6/24  Date of Return to MD: 5/29/24  Visit # / Visits authorized: 1/1  FOTO: Initial evaluation/28.3    Precautions:  Standard; Pt is 8 weeks/4 days post-fracture     Time In:10:48  Time Out: 11:30  Total Appointment Time (timed & untimed codes): 42 minutes    Subjective     Date of Onset: 3/7/24    History of Current Condition/Mechanism of Injury: Rivka reports: "I didn't have to go to work like I thought I was. My son understands."     Falls: Yes    Involved Side: Left  Dominant Side: Right    Mechanism of Injury: FOOSH injury  Imaging: X-Rays 4/30/24 L wrist  "COMPARISON:  03/27/2024     FINDINGS:  Redemonstration of comminuted intra-articular distal radial fracture.  Degree of lucency at the site of fracture persists with some increased sclerosis suggesting continued healing.  Similar thumb base DJD."    Prior Therapy: No    Pain:  Functional Pain Scale Rating 0-10:   4/10 on average  1/10 at best  6/10 at worst  Location: L dorsal radial wrist  Description: mild and aching   Aggravating Factors: certain motions   Easing Factors: rest    Occupation:  service industry  Working presently: Not at this time   Duties: N/A    Functional Limitations/Social History:    Previous functional status includes: Independent with all ADLs.     Current Functional Status   Home/Living environment: lives with their son    - 1 story home, 0 steps to " enter    - DME: N/A      Limitation of Functional Status as follows:   ADLs/IADLs:     - Feeding: Mod I     - Bathing: Mod I    - Dressing/Grooming: Mod I    - Home Management: SBA-Min A    - Driving: Mod I     Leisure: spending time with family     Patient's Goals for Therapy: Recover from injury     Past Medical History/Physical Systems Review:   Rivka Ivy  has a past medical history of ALLERGIC RHINITIS, Benign hypertension, GERD (gastroesophageal reflux disease), History of ectopic pregnancy, History of positive PPD, untreated, History of positive PPD, untreated, Hyperlipidemia, Iron deficiency, MRSA (methicillin resistant Staphylococcus aureus), Multiple thyroid nodules, Obesity, and Vitamin D deficiency disease.    Rivka Ivy  has a past surgical history that includes Tubal ligation; salpingectomy for tubal pregnancy; and Hysterectomy.    Rivka has a current medication list which includes the following prescription(s): albuterol, amlodipine, atorvastatin, benazepril, cetirizine, fluticasone propionate, metformin, methylprednisolone, naproxen, oxycodone-acetaminophen, sertraline, sertraline, and sod chlor,sod bicarb/neti pot.    Review of patient's allergies indicates:   Allergen Reactions    Codeine      Other reaction(s): Itching        Objective     Observation/Appearance:  Skin intact, skin dry, and edema present    Edema. Measured in centimeters.   5/6/2024 5/6/2024    Right  Left    Proximal Wrist Crease 17.2 17.8         Hand/Wrist ROM. Measured in degrees.   5/6/2024 5/6/2024    Right  Left     WNL WNL      Wrist Flex/Ext: 25/55   (R: 55/60)   Wrist RD/UD: 10/30     (R: 10/40)      Sensation  Median Nerve Distribution 5/6/2024 5/6/2024    Right  Left    [place]     Normal 1.65-2.83 X X   Diminished Light Touch 3.22-3.61     Diminished Protective 3.84-4.31     Loss of Protective 4.56-6.65     Untestable >6.65          Strength (Dyanmometer) and Pinch Strength (Pinch Gauge)- deferred  at this time   Measured in pounds and psi. Average of three trials.   5/6/2024 5/6/2024    Right  Left    Rung II     Russo Pinch     3pt Pinch     2pt Pinch         Manual Muscle Testing- deferred at this time: TBA in 1-2 weeks          CMS Impairment/Limitation/Restriction for FOTO Wrist Survey    Therapist reviewed FOTO scores for Rivka Ivy on 5/6/2024.   FOTO documents entered into [employer] - see Media section.    Limitation Score: 28.3%  Category: ADLs and IADLs         Treatment     Total Treatment time separate from Evaluation time:10 minutes     Rivka received the treatments listed below:      Therapeutic activities to improve functional performance for 10 minutes, including:  -Tendon glides & wrist flexion/extension (AROM)   -Education on modality use; contrast method  -Education on fracture healing timelines and activity modifications     Patient Education and Home Exercises      Education provided:   -Role of OT, goals for OT, scheduling/cancellations, insurance limitations with patient.    Written Home Exercises Provided: yes.  Exercises were reviewed and Rivka was able to demonstrate them prior to the end of the session.    Rivka demonstrated good  understanding of the education provided.     Pt was advised to perform these exercises free of pain, and to stop performing them if pain occurs.    See EMR under Patient Instructions for exercises provided 5/6/2024.    Assessment     Rivka Ivy is a 64 y.o. female referred to outpatient occupational therapy and presents with a medical diagnosis of S52.502A (ICD-10-CM) - Nondisplaced fracture of distal end of left radius.    Following medical record review it is determined that pt will benefit from occupational therapy services in order to maximize pain free and/or functional use of her L wrist & hand. The following goals were discussed with the patient and patient is in agreement with them as to be addressed in the treatment plan. The patient's rehab  potential is Good.     Anticipated barriers to Occupational Therapy: None noted     Plan of care discussed with patient: Yes  Patient's spiritual, cultural and educational needs considered and patient is agreeable to the plan of care and goals as stated below:     Medical Necessity is demonstrated by the following  Occupational Profile/History  Co-morbidities and personal factors that may impact the plan of care [x] LOW: Brief chart review  [] MODERATE: Expanded chart review   [] HIGH: Extensive chart review    Moderate / High Support Documentation: N/A     Examination  Performance deficits relating to physical, cognitive or psychosocial skills that result in activity limitations and/or participation restrictions  [] LOW: addressing 1-3 Performance deficits  [x] MODERATE: 3-5 Performance deficits  [] HIGH: 5+ Performance deficits (please support below)    Moderate / High Support Documentation:    Physical:  Joint Mobility  Joint Stability  Muscle Power/Strength  Edema  Pain    Cognitive:  No Deficits    Psychosocial:    No Deficits     Treatment Options [x] LOW: Limited options  [] MODERATE: Several options  [] HIGH: Multiple options      Decision Making/ Complexity Score: low       The following goals were discussed with the patient and patient is in agreement with them as to be addressed in the treatment plan.       ShortTerm Goals (to be met in 3 weeks or by 5/27/24 ):   1. Patient to be IND and compliant with HEP & attendance for duration of therapy.  2. Patient will demonstrate increased L wrist ROM within 5-10 degrees or her R wrist ROM to restore functional hand use for ADLs and IADLs.   3. Assess , pinches, and isolated wist/forearm MMT when appropriate and amend LTG.  4. Patient will report no more than 3/10 pain in L wrist and hand.       Long Term Goals (to be met in 6 weeks or by DC):   1. Patient to be IND and compliant with HEP & attendance for duration of therapy.  2. Patient will report increase  in functional use of her L wrist & hand by 10% as evidenced by the FOTO outcome measure.   3. Patient will report no more than 1/10 pain in L wrist & hand.     Plan   Certification Period/Plan of care expiration: 5/6/2024 to 8/6/24.    Outpatient Occupational Therapy 2 times weekly for 6 weeks to include the following interventions: Fluidotherapy, Manual therapy/joint mobilizations, Modalities for pain management, US 3 mhz, Therapeutic exercises/activities., Strengthening, and Edema Control.        Thank you for allowing me to assist in the care of your Patient. If you have any questions or concerns, please don't hesitate to e-mail or contact me directly.      CARMEN Rai/L  [facility]   Phone: [phone]  Fax: [phone]

## 2024-05-14 NOTE — PROGRESS NOTES
"[facility]  Occupational Therapy Treatment Note     Date: 5/16/2024  Name: Rivka Ivy  Clinic Number: [id number]    Therapy Diagnosis:   Encounter Diagnoses   Name Primary?    Nondisplaced fracture of distal end of left radius Yes    Decreased range of motion of left wrist     Decreased strength of upper extremity     Impaired instrumental activities of daily living (IADL)      Physician: Kaya Givens, *    Physician Orders: Eval and Treat  Medical Diagnosis: S52.502A (ICD-10-CM) - Nondisplaced fracture of distal end of left radius   Date/Mechanism of Injury: FOOSH injury, 3/7/24  Evaluation Date: 5/6/2024  Insurance Authorization Period Expiration: 7/13/24  Plan of Care Certification Period: 5/6/24-8/6/24  Progress Note Due: 6/6/24  Date of Return to MD: 5/29/24  Visit # / Visits authorized: 1/12  FOTO: Initial evaluation/28.3     Precautions:  Standard; Pt is 10 weeks post-fracture     Time In: 11:37  Time Out: 12:15  Total Billable Time: 38 minutes    Subjective     Pt reports: "Some days it aches a lot- but other days not so much."     She was compliant with home exercise program given last session.     Response to previous treatment:favorable    Functional change: Increased (I) with ADLs     Pain: 0/10  Location: left forearm and wrist       Objective     Objective Measures updated at progress report unless specified.    Hand/Wrist ROM. Measured in degrees.    5/6/2024 5/6/2024     Right  Left      WNL WNL       Wrist Flex/Ext: 60/60 (+35 /+5)   (R: 55/60)   Wrist RD/UD: 10/35  (same/+5)   (R: 10/40)     Strength (Dyanmometer) and Pinch Strength (Pinch Gauge)  Measured in pounds and psi. Average of three trials.    5/16/2024 5/16/2024     Right  Left    Rung II  53.7  30.8   Russo Pinch 18   14   3pt Pinch  14  10    2pt Pinch  12  8     Treatment     Rivka received the treatments listed below:      -All billed as therapeutic activities due to Medicaid     Therapeutic " activities to improve functional performance for 23 minutes, including:  -Provision of  strengthening HEP: Theraputty (red/yellow) for composite   -Review of activity modifications for current fracture healing timelines       Direct contact modalities after being cleared for contraindications:   -Utrasound for scar tissue remodeling, increased circulation, & tissue elasticity @ 100% duty cycle, 3.3 Mhz, applied to L dorsal wrist, intensity = 0.6 w/cm2 for 8 minutes. Patient tolerated treatment well without adverse effects. Therapist was in attendance throughout intervention.      Supervised modalities after being cleared for contradictions:   -Moist heat application to L wrist/forearm for 7 minutes to facilitate tissue extensibility & ROM prior to therex      Patient Education and Home Exercises     Education provided:   - Importance of compliance w/ HEP to maximize gains   - Progress towards goals     Written Home Exercises Provided: yes. Red/yellow Theraputty provided.   Exercises were reviewed and Rivka was able to demonstrate them prior to the end of the session.  Rivka demonstrated good  understanding of the HEP provided. See EMR under Patient Instructions for exercises provided during therapy sessions.       Assessment     Pt would continue to benefit from skilled OT. Pt tolerated session well, noting no increased wrist or forearm pain reported with today's  strengthening.     Rivka is progressing well towards her goals and there are no updates to goals at this time. Pt prognosis is Good.     Pt will continue to benefit from skilled Outpatient Occupational Therapy to address the deficits listed in the problem list on initial evaluation provide Pt/family education and to maximize Pt's level of independence in the home and community environment.     Pt's spiritual, cultural and educational needs considered and pt agreeable to plan of care and goals.    Anticipated barriers to occupational therapy:  None noted     ShortTerm Goals (to be met in 3 weeks or by 5/27/24 ):   1. Patient to be IND and compliant with HEP & attendance for duration of therapy.  2. Patient will demonstrate increased L wrist ROM within 5-10 degrees or her R wrist ROM to restore functional hand use for ADLs and IADLs.   3. Assess , pinches, and isolated wist/forearm MMT when appropriate and amend LTG. Partially Met 5/16  4. Patient will report no more than 3/10 pain in L wrist and hand.         Updated Long Term Goals (to be met in 6 weeks or by DC):   1. Patient to be IND and compliant with HEP & attendance for duration of therapy.  2. Patient will report increase in functional use of her L wrist & hand by 10% as evidenced by the FOTO outcome measure.   3. Patient will report no more than 1/10 pain in L wrist & hand.   4. Patient will demonstrate increased L  strength by 10-15 lbs.   5. Patient will demonstrate increased L pinch strength (3 positions) by 2 psi's to restore normative fine motor performance.    Plan     Patient is to be treated by Occupational Therapy 2 times per week for 6 weeks, or 12 visits, during the certification period from 5/6/24 to 8/6/24 to achieve the established goals.       Updates/Grading for next session: TBD pending progress       ESTER Rai

## 2024-05-16 ENCOUNTER — CLINICAL SUPPORT (OUTPATIENT)
Dept: REHABILITATION | Facility: HOSPITAL | Age: 64
End: 2024-05-16
Payer: MEDICAID

## 2024-05-16 DIAGNOSIS — S52.502A NONDISPLACED FRACTURE OF DISTAL END OF LEFT RADIUS: Primary | ICD-10-CM

## 2024-05-16 DIAGNOSIS — R29.898 DECREASED STRENGTH OF UPPER EXTREMITY: ICD-10-CM

## 2024-05-16 DIAGNOSIS — Z78.9 IMPAIRED INSTRUMENTAL ACTIVITIES OF DAILY LIVING (IADL): ICD-10-CM

## 2024-05-16 DIAGNOSIS — M25.632 DECREASED RANGE OF MOTION OF LEFT WRIST: ICD-10-CM

## 2024-05-16 PROCEDURE — 97530 THERAPEUTIC ACTIVITIES: CPT | Mod: PN

## 2024-05-20 ENCOUNTER — CLINICAL SUPPORT (OUTPATIENT)
Dept: REHABILITATION | Facility: HOSPITAL | Age: 64
End: 2024-05-20
Payer: MEDICAID

## 2024-05-20 DIAGNOSIS — Z78.9 IMPAIRED INSTRUMENTAL ACTIVITIES OF DAILY LIVING (IADL): ICD-10-CM

## 2024-05-20 DIAGNOSIS — R29.898 DECREASED STRENGTH OF UPPER EXTREMITY: ICD-10-CM

## 2024-05-20 DIAGNOSIS — M25.632 DECREASED RANGE OF MOTION OF LEFT WRIST: ICD-10-CM

## 2024-05-20 DIAGNOSIS — S52.502A NONDISPLACED FRACTURE OF DISTAL END OF LEFT RADIUS: Primary | ICD-10-CM

## 2024-05-20 PROCEDURE — 97530 THERAPEUTIC ACTIVITIES: CPT | Mod: PN

## 2024-05-20 NOTE — PROGRESS NOTES
"[facility]  Occupational Therapy Treatment Note     Date: 5/20/2024  Name: Rivka Ivy  Clinic Number: [id number]    Therapy Diagnosis:   Encounter Diagnoses   Name Primary?    Nondisplaced fracture of distal end of left radius Yes    Decreased range of motion of left wrist     Decreased strength of upper extremity     Impaired instrumental activities of daily living (IADL)      Physician: Kaya Givens, *    Physician Orders: Eval and Treat  Medical Diagnosis: S52.502A (ICD-10-CM) - Nondisplaced fracture of distal end of left radius   Date/Mechanism of Injury: FOOSH injury, 3/7/24  Evaluation Date: 5/6/2024  Insurance Authorization Period Expiration: 7/13/24  Plan of Care Certification Period: 5/6/24-8/6/24  Progress Note Due: 6/6/24  Date of Return to MD: 5/29/24  Visit # / Visits authorized: 2/12  FOTO: Initial evaluation/28.3     Precautions:  Standard; Pt is 10 weeks/4 days post-fracture     Time In: 12:19  Time Out: 13:00  Total Billable Time: 41 minutes    Subjective     Pt reports: "Today is a good day."     She was compliant with home exercise program given last session.     Response to previous treatment:favorable    Functional change: Increased (I) with ADLs     Pain: 0/10  Location: left forearm and wrist       Objective     Objective Measures updated at progress report unless specified.    Treatment     Rivka received the treatments listed below:      -All billed as therapeutic activities due to Medicaid     Therapeutic activities to improve functional performance for 32 minutes, including:  -PHG 50# for composite  x 5-7 min   -EDC strengthening x 5 min   -Lateral/key pinch and 3 pt pinches (green) x 3 min each  -Ergo-gripper (2 red)  x 5 min   -Review of activity modifications for current fracture healing timelines       Direct contact modalities after being cleared for contraindications:   -Utrasound for scar tissue remodeling, increased circulation, & tissue " elasticity @ 100% duty cycle, 3.3 Mhz, applied to L dorsal wrist, intensity = 0.6 w/cm2 for 8 minutes. Patient tolerated treatment well without adverse effects. Therapist was in attendance throughout intervention.      Patient Education and Home Exercises     Education provided:   - Importance of compliance w/ HEP to maximize gains   - Progress towards goals     Written Home Exercises Provided: yes. Red/green Theraputty provided.   Exercises were reviewed and Rivka was able to demonstrate them prior to the end of the session.  Rivka demonstrated good  understanding of the HEP provided. See EMR under Patient Instructions for exercises provided during therapy sessions.       Assessment     Pt would continue to benefit from skilled OT. Pt tolerated session well, noting no increased wrist or forearm pain reported with today's upgraded  strengthening.     Rivka is progressing well towards her goals and there are no updates to goals at this time. Pt prognosis is Good.     Pt will continue to benefit from skilled Outpatient Occupational Therapy to address the deficits listed in the problem list on initial evaluation provide Pt/family education and to maximize Pt's level of independence in the home and community environment.     Pt's spiritual, cultural and educational needs considered and pt agreeable to plan of care and goals.    Anticipated barriers to occupational therapy: None noted     ShortTerm Goals (to be met in 3 weeks or by 5/27/24 ):   1. Patient to be IND and compliant with HEP & attendance for duration of therapy.  2. Patient will demonstrate increased L wrist ROM within 5-10 degrees or her R wrist ROM to restore functional hand use for ADLs and IADLs.   3. Assess , pinches, and isolated wist/forearm MMT when appropriate and amend LTG. Partially Met 5/16  4. Patient will report no more than 3/10 pain in L wrist and hand.         Updated Long Term Goals (to be met in 6 weeks or by DC):   1. Patient to  be IND and compliant with HEP & attendance for duration of therapy.  2. Patient will report increase in functional use of her L wrist & hand by 10% as evidenced by the FOTO outcome measure.   3. Patient will report no more than 1/10 pain in L wrist & hand.   4. Patient will demonstrate increased L  strength by 10-15 lbs.   5. Patient will demonstrate increased L pinch strength (3 positions) by 2 psi's to restore normative fine motor performance.    Plan     Patient is to be treated by Occupational Therapy 2 times per week for 6 weeks, or 12 visits, during the certification period from 5/6/24 to 8/6/24 to achieve the established goals.       Updates/Grading for next session: TBD pending progress       ESTER Rai

## 2024-05-21 NOTE — PROGRESS NOTES
"[facility]  Occupational Therapy Treatment Note     Date: 5/23/2024  Name: Rivka Ivy  Clinic Number: [id number]    Therapy Diagnosis:   Encounter Diagnoses   Name Primary?    Nondisplaced fracture of distal end of left radius Yes    Decreased range of motion of left wrist     Decreased strength of upper extremity     Impaired instrumental activities of daily living (IADL)      Physician: Kaya Givens, *    Physician Orders: Eval and Treat  Medical Diagnosis: S52.502A (ICD-10-CM) - Nondisplaced fracture of distal end of left radius   Date/Mechanism of Injury: FOOSH injury, 3/7/24  Evaluation Date: 5/6/2024  Insurance Authorization Period Expiration: 7/13/24  Plan of Care Certification Period: 5/6/24-8/6/24  Progress Note Due: 6/6/24  Date of Return to MD: 5/29/24  Visit # / Visits authorized: 3/12  FOTO: Initial evaluation/28.3     Precautions:  Standard; Pt is 11 weeks post-fracture     Time In: 13:05  Time Out: 13:45  Total Billable Time: 40 minutes    Subjective     Pt reports: "My wrist and hand feel pretty good."     She was compliant with home exercise program given last session.     Response to previous treatment:favorable    Functional change: Increased (I) with ADLs     Pain: 2/10  Location: left dorsal/radial wrist     Objective     Objective Measures updated at progress report unless specified.    Treatment     Rivka received the treatments listed below:      -All billed as therapeutic activities due to Medicaid     Therapeutic activities to improve functional performance for 32 minutes, including:  -PHG 50# for composite  x 5-7 min   -EDC strengthening x 5 min   -Lateral/key pinch and 3 pt pinches (green) x 3 min each  -Ergo-gripper (2 red)  x 5 min   -Review of activity modifications for current fracture healing timelines       Direct contact modalities after being cleared for contraindications:   -Utrasound for scar tissue remodeling, increased circulation, & " tissue elasticity @ 100% duty cycle, 3.3 Mhz, applied to L dorsal wrist, intensity = 0.6 w/cm2 for 8 minutes. Patient tolerated treatment well without adverse effects. Therapist was in attendance throughout intervention.      Patient Education and Home Exercises     Education provided:   - Importance of compliance w/ HEP to maximize gains   - Progress towards goals     Written Home Exercises Provided:Continue w/ previous HEP.  Exercises were reviewed and Rivka was able to demonstrate them prior to the end of the session.  Rivka demonstrated good  understanding of the HEP provided. See EMR under Patient Instructions for exercises provided during previous therapy sessions.       Assessment     Pt would continue to benefit from skilled OT. Pt tolerated session well, noting no increased wrist or forearm pain reported with today's session  F/U scheduled 5/29/24.   Rivka is progressing well towards her goals and there are no updates to goals at this time. Pt prognosis is Good.     Pt will continue to benefit from skilled Outpatient Occupational Therapy to address the deficits listed in the problem list on initial evaluation provide Pt/family education and to maximize Pt's level of independence in the home and community environment.     Pt's spiritual, cultural and educational needs considered and pt agreeable to plan of care and goals.    Anticipated barriers to occupational therapy: None noted     ShortTerm Goals (to be met in 3 weeks or by 5/27/24 ):   1. Patient to be IND and compliant with HEP & attendance for duration of therapy.  2. Patient will demonstrate increased L wrist ROM within 5-10 degrees or her R wrist ROM to restore functional hand use for ADLs and IADLs.   3. Assess , pinches, and isolated wist/forearm MMT when appropriate and amend LTG. Partially Met 5/16  4. Patient will report no more than 3/10 pain in L wrist and hand.         Updated Long Term Goals (to be met in 6 weeks or by DC):   1. Patient  to be IND and compliant with HEP & attendance for duration of therapy.  2. Patient will report increase in functional use of her L wrist & hand by 10% as evidenced by the FOTO outcome measure.   3. Patient will report no more than 1/10 pain in L wrist & hand.   4. Patient will demonstrate increased L  strength by 10-15 lbs.   5. Patient will demonstrate increased L pinch strength (3 positions) by 2 psi's to restore normative fine motor performance.    Plan     Patient is to be treated by Occupational Therapy 2 times per week for 6 weeks, or 12 visits, during the certification period from 5/6/24 to 8/6/24 to achieve the established goals.       Updates/Grading for next session: TBD pending progress       ESTER Rai

## 2024-05-23 ENCOUNTER — CLINICAL SUPPORT (OUTPATIENT)
Dept: REHABILITATION | Facility: HOSPITAL | Age: 64
End: 2024-05-23
Payer: MEDICAID

## 2024-05-23 DIAGNOSIS — M25.632 DECREASED RANGE OF MOTION OF LEFT WRIST: ICD-10-CM

## 2024-05-23 DIAGNOSIS — Z78.9 IMPAIRED INSTRUMENTAL ACTIVITIES OF DAILY LIVING (IADL): ICD-10-CM

## 2024-05-23 DIAGNOSIS — S52.502A NONDISPLACED FRACTURE OF DISTAL END OF LEFT RADIUS: Primary | ICD-10-CM

## 2024-05-23 DIAGNOSIS — R29.898 DECREASED STRENGTH OF UPPER EXTREMITY: ICD-10-CM

## 2024-05-23 PROCEDURE — 97530 THERAPEUTIC ACTIVITIES: CPT | Mod: PN

## 2024-05-29 ENCOUNTER — OFFICE VISIT (OUTPATIENT)
Dept: ORTHOPEDICS | Facility: CLINIC | Age: 64
End: 2024-05-29
Payer: MEDICAID

## 2024-05-29 VITALS
DIASTOLIC BLOOD PRESSURE: 85 MMHG | WEIGHT: 198.75 LBS | SYSTOLIC BLOOD PRESSURE: 138 MMHG | HEART RATE: 67 BPM | BODY MASS INDEX: 33.93 KG/M2 | HEIGHT: 64 IN

## 2024-05-29 DIAGNOSIS — G56.02 LEFT CARPAL TUNNEL SYNDROME: Primary | ICD-10-CM

## 2024-05-29 PROCEDURE — 1160F RVW MEDS BY RX/DR IN RCRD: CPT | Mod: CPTII,,,

## 2024-05-29 PROCEDURE — 99213 OFFICE O/P EST LOW 20 MIN: CPT | Mod: S$PBB,,,

## 2024-05-29 PROCEDURE — 99214 OFFICE O/P EST MOD 30 MIN: CPT | Mod: PBBFAC,PN

## 2024-05-29 PROCEDURE — 3075F SYST BP GE 130 - 139MM HG: CPT | Mod: CPTII,,,

## 2024-05-29 PROCEDURE — 1159F MED LIST DOCD IN RCRD: CPT | Mod: CPTII,,,

## 2024-05-29 PROCEDURE — 99999 PR PBB SHADOW E&M-EST. PATIENT-LVL IV: CPT | Mod: PBBFAC,,,

## 2024-05-29 PROCEDURE — 4010F ACE/ARB THERAPY RXD/TAKEN: CPT | Mod: CPTII,,,

## 2024-05-29 PROCEDURE — 3008F BODY MASS INDEX DOCD: CPT | Mod: CPTII,,,

## 2024-05-29 PROCEDURE — 3079F DIAST BP 80-89 MM HG: CPT | Mod: CPTII,,,

## 2024-05-29 NOTE — PROGRESS NOTES
SUBJECTIVE:      Chief Complaint: left wrist fracture    History of Present Illness:  Patient is a 64 y.o. right hand dominant female who presents today with complaints of left wrist pain. Patient reports she missed a step and had FOOSH type fall 6 days prior. She went to urgent care and was found to have a nondisplaced left distal radius fracture and placed in long arm splint. Here today for follow up. Denies any numbness ot tinging. Pain around wrist.  She does have a codeine allergy and has been taking tramadol which causes itching.  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    The patient is a/an .    Onset of symptoms/DOI was 6 days prior.    Symptoms are aggravated by movement.    Symptoms are alleviated by rest.    Symptoms consist of pain and swelling.    The patient rates their pain as a 6/10.    Attempted treatment(s) and/or interventions include activity modifications, rest, immobilization.     The patient denies any fevers, chills, N/V, D/C and presents for evaluation.    ____________________________________________________________________    Interval history 3/27/2024 : Patient returns today for follow up of left distal radius fracture.  She was unable to  Percocet from [facility].  She endorses radial wrist pain.  She has been in short-arm splint.  Denies numbness, tingling or radiation.    ____________________________________________________________________    Interval history 4/30/2024 : Patient returns today for follow up of left distal radius fracture.  Overall doing well. Mostly having pain at night.    ____________________________________________________________________    Interval history 5/29/2024: Patient returns today for follow up of left distal radius fracture. She has been in OT.  Over the last month she has started to endorse numbness  and tingling in median nerve distrubution, more  bothersome at night. She has been wearing the brace at night. Pain overall much improved.       Past Medical History:   Diagnosis Date    ALLERGIC RHINITIS     Benign hypertension     GERD (gastroesophageal reflux disease)     History of ectopic pregnancy     History of positive PPD, untreated     History of positive PPD, untreated     Hyperlipidemia     Iron deficiency     MRSA (methicillin resistant Staphylococcus aureus)     abscess 2207 treated    Multiple thyroid nodules     Obesity     Vitamin D deficiency disease      Past Surgical History:   Procedure Laterality Date    HYSTERECTOMY      salpingectomy for tubal pregnancy      right    TUBAL LIGATION       Review of patient's allergies indicates:   Allergen Reactions    Codeine      Other reaction(s): Itching     Social History     Social History Narrative    Not on file     Family History   Problem Relation Name Age of Onset    Hypertension Mother      Heart disease Father      Hypertension Father      Cancer Brother          gastric    Hypertension Brother      Diabetes Brother      Hypertension Brother           Current Outpatient Medications:     albuterol (VENTOLIN HFA) 90 mcg/actuation inhaler, Inhale 2 puffs into the lungs every 6 (six) hours as needed for Wheezing. Rescue, Disp: 18 g, Rfl: 0    amLODIPine (NORVASC) 10 MG tablet, amlodipine 10 mg tablet, Disp: , Rfl:     atorvastatin (LIPITOR) 40 MG tablet, Take 1 tablet by mouth once daily., Disp: , Rfl:     benazepriL (LOTENSIN) 40 MG tablet, Take 1 tablet by mouth once daily., Disp: , Rfl:     fluticasone propionate (FLONASE) 50 mcg/actuation nasal spray, 1 spray (50 mcg total) by Each Nostril route once daily., Disp: 9.9 mL, Rfl: 0    metFORMIN (GLUCOPHAGE) 500 MG tablet, metformin 500 mg tablet  TAKE ONE TABLET BY MOUTH TWICE DAILY, Disp: , Rfl:     methylPREDNISolone (MEDROL DOSEPACK) 4 mg tablet, use as directed, Disp: 21 each, Rfl: 0    naproxen (NAPROSYN) 500 MG tablet, Take 1 tablet (500 mg  "total) by mouth 2 (two) times daily., Disp: 60 tablet, Rfl: 1    oxyCODONE-acetaminophen (PERCOCET) 5-325 mg per tablet, Take 1 tablet by mouth every 4 (four) hours as needed., Disp: 28 each, Rfl: 0    sertraline (ZOLOFT) 100 MG tablet, TAKE TWO TABLETS BY MOUTH EVERY EVENING, Disp: 180 tablet, Rfl: 3    sertraline (ZOLOFT) 50 MG tablet, sertraline 50 mg tablet  TAKE ONE TABLET BY MOUTH ONCE DAILY, Disp: , Rfl:     SODIUM CHLORIDE/SODIUM BICARB (NEILMED NASAFLO NASL), , Disp: , Rfl:     cetirizine (ZYRTEC) 10 MG tablet, Take 1 tablet (10 mg total) by mouth once daily., Disp: 30 tablet, Rfl: 0      Review of Systems:  Constitutional: no fever or chills  Eyes: no visual changes  ENT: no nasal congestion or sore throat  Respiratory: no cough or shortness of breath  Cardiovascular: no chest pain  Gastrointestinal: no nausea or vomiting, tolerating diet  Musculoskeletal: pain and soreness    OBJECTIVE:      Vital Signs (Most Recent):  Vitals:    05/29/24 1039   BP: 138/85   Pulse: 67   Weight: 90.2 kg (198 lb 11.9 oz)   Height: 5' 4" (1.626 m)         Body mass index is 34.11 kg/m².      Physical Exam:  Constitutional: The patient appears well-developed and well-nourished. No distress.   Skin: No lesions appreciated  Head: Normocephalic and atraumatic.   Nose: Nose normal.   Ears: No deformities seen  Eyes: Conjunctivae and EOM are normal.   Neck: No tracheal deviation present.   Cardiovascular: Normal rate and intact distal pulses.    Pulmonary/Chest: Effort normal. No respiratory distress.   Abdominal: There is no guarding.   Neurological: The patient is alert.   Psychiatric: The patient has a normal mood and affect.     Left Hand/Wrist Examination:    Observation/Inspection:  Swelling  none    Deformity  none  Discoloration  none     Scars   none    Atrophy  None  Ecchymosis  none    HAND/WRIST EXAMINATION:  Tenderness to palpation at DRUJ you and distal radius.  No tenderness at radial styloid.  Full range of motion " of hand and elbow.    + tinels at the wrist, median nerve compression test    Neurovascular Exam:  Digits WWP, brisk CR < 3s throughout  NVI motor/LTS to M/R/U nerves, radial pulse 2+  AIN Intact (O-Delores), PIN Intact (Thumbs Up), Ulnar Intact (scissors)    ROM hand full, painless    ROM elbow full, painless    Abdomen not guarded  Respirations nonlabored  Perfusion intact    Diagnostic Results:     Imaging - I independently viewed the patient's imaging as well as the radiology report.  Xrays of the patient's left wrist demonstrate acute nondisplaced transverse fracture of the distal radius.    X-ray left elbow reviewed by me which shows no evidence of acute fracture or dislocation.    Xray left wrist 3/27/2024 reviewed by me which shows acute nondisplaced fracture of distal radius with intra-articular extension    CT left wrist: Comminuted intra-articular mildly displaced/depressed left distal radius fracture.     ASSESSMENT/PLAN:      1. Left carpal tunnel syndrome  EMG W/ ULTRASOUND AND NERVE CONDUCTION TEST 1 Extremity        Patient returns today for follow up of left wrist fracture.  Pain is overall which improved since initial onset.  She is now endorsing carpal tunnel-like symptoms.  We discussed EMG to evaluate for diagnosis of carpal tunnel syndrome.  EMG left upper extremity order placed.  She may continue occupational therapy and encouraged bracing at night.  Carpal tunnel exercises given as well.  We will see her back to discuss EMG results, may consider injections versus surgical intervention if indicated.    Follow up: EMG  Xrays needed: none    All of the patient's questions were answered and the patient will contact us if they have any questions or concerns in the interim.

## 2024-05-31 ENCOUNTER — DOCUMENTATION ONLY (OUTPATIENT)
Dept: REHABILITATION | Facility: HOSPITAL | Age: 64
End: 2024-05-31
Payer: MEDICAID

## 2024-05-31 DIAGNOSIS — Z78.9 IMPAIRED INSTRUMENTAL ACTIVITIES OF DAILY LIVING (IADL): ICD-10-CM

## 2024-05-31 DIAGNOSIS — M25.632 DECREASED RANGE OF MOTION OF LEFT WRIST: Primary | ICD-10-CM

## 2024-05-31 DIAGNOSIS — R29.898 DECREASED STRENGTH OF UPPER EXTREMITY: ICD-10-CM

## 2024-05-31 NOTE — PROGRESS NOTES
Pt contacted the therapy clinic approx. 1.5 hours prior to scheduled OT appointment time on 5/31/24 requesting to cancel due to family sickness. Will follow up next treatment day.     CARMEN Rai/L  [facility] and [facility]

## 2024-06-03 ENCOUNTER — CLINICAL SUPPORT (OUTPATIENT)
Dept: REHABILITATION | Facility: HOSPITAL | Age: 64
End: 2024-06-03
Payer: MEDICAID

## 2024-06-03 DIAGNOSIS — Z78.9 IMPAIRED INSTRUMENTAL ACTIVITIES OF DAILY LIVING (IADL): ICD-10-CM

## 2024-06-03 DIAGNOSIS — R29.898 DECREASED STRENGTH OF UPPER EXTREMITY: ICD-10-CM

## 2024-06-03 DIAGNOSIS — M25.632 DECREASED RANGE OF MOTION OF LEFT WRIST: ICD-10-CM

## 2024-06-03 DIAGNOSIS — S52.502A NONDISPLACED FRACTURE OF DISTAL END OF LEFT RADIUS: Primary | ICD-10-CM

## 2024-06-03 PROCEDURE — 97530 THERAPEUTIC ACTIVITIES: CPT | Mod: PN

## 2024-06-03 NOTE — PROGRESS NOTES
"[facility]  Occupational Therapy Treatment Note     Date: 6/3/2024  Name: Rivka Ivy  Clinic Number: [id number]    Therapy Diagnosis:   Encounter Diagnoses   Name Primary?    Nondisplaced fracture of distal end of left radius Yes    Decreased range of motion of left wrist     Decreased strength of upper extremity     Impaired instrumental activities of daily living (IADL)      Physician: Kaya Givens, *    Physician Orders: Eval and Treat  Medical Diagnosis: S52.502A (ICD-10-CM) - Nondisplaced fracture of distal end of left radius   Date/Mechanism of Injury: FOOSH injury, 3/7/24  Evaluation Date: 5/6/2024  Insurance Authorization Period Expiration: 7/13/24  Plan of Care Certification Period: 5/6/24-8/6/24  Progress Note Due: 6/6/24  Date of Return to MD: 5/29/24  Visit # / Visits authorized: 4/12  FOTO: Initial evaluation/28.3     Precautions:  Standard; Pt is 11 weeks post-fracture     Time In: 12:17  Time Out: 13:02  Total Billable Time: 45 minutes    Subjective     Pt reports: "My wrist feels good- I'm just having the tingling mostly."     She was compliant with home exercise program given last session.     Response to previous treatment:favorable    Functional change: Increased (I) with ADLs     Pain: 0/10  Location: left dorsal/radial wrist     Objective     Objective Measures updated at progress report unless specified.    Treatment     Rivka received the treatments listed below:      -All billed as therapeutic activities due to Medicaid     Therapeutic exercises & activities to improve functional performance for 32 minutes, including:  -PHG 50# for composite  x 5-7 min   -EDC strengthening x 5 min   -Lateral/key pinch and 3 pt pinches (green) x 3 min each  -Ergo-gripper (2 red)  x 5 min   -2# free weight for wrist flex/ext/sup/pronation x 2 min each   -Review of activity modifications for current fracture healing timelines      Direct contact modalities after being " cleared for contraindications:   -Utrasound for scar tissue remodeling, increased circulation, & tissue elasticity @ 100% duty cycle, 3.3 Mhz, applied to L dorsal wrist, intensity = 0.6 w/cm2 for 8 minutes. Patient tolerated treatment well without adverse effects. Therapist was in attendance throughout intervention.      Patient Education and Home Exercises     Education provided:   - Importance of compliance w/ HEP to maximize gains   - Progress towards goals     Written Home Exercises Provided:Continue w/ previous HEP.  Exercises were reviewed and Rivka was able to demonstrate them prior to the end of the session.  Rivka demonstrated good  understanding of the HEP provided. See EMR under Patient Instructions for exercises provided during previous therapy sessions.       Assessment     Pt would continue to benefit from skilled OT. Pt tolerated session well, noting no increased wrist or forearm pain reported with today's session. Pt reports L hand paresthesias mild in severity over the past week. EMG ordered, but not yet scheduled.     Rivka is progressing well towards her goals and there are no updates to goals at this time. Pt prognosis is Good.     Pt will continue to benefit from skilled Outpatient Occupational Therapy to address the deficits listed in the problem list on initial evaluation provide Pt/family education and to maximize Pt's level of independence in the home and community environment.     Pt's spiritual, cultural and educational needs considered and pt agreeable to plan of care and goals.    Anticipated barriers to occupational therapy: None noted     ShortTerm Goals (to be met in 3 weeks or by 5/27/24 ):   1. Patient to be IND and compliant with HEP & attendance for duration of therapy.  2. Patient will demonstrate increased L wrist ROM within 5-10 degrees or her R wrist ROM to restore functional hand use for ADLs and IADLs.   3. Assess , pinches, and isolated wist/forearm MMT when appropriate  and amend LTG. Partially Met 5/16  4. Patient will report no more than 3/10 pain in L wrist and hand.         Updated Long Term Goals (to be met in 6 weeks or by DC):   1. Patient to be IND and compliant with HEP & attendance for duration of therapy.  2. Patient will report increase in functional use of her L wrist & hand by 10% as evidenced by the FOTO outcome measure.   3. Patient will report no more than 1/10 pain in L wrist & hand.   4. Patient will demonstrate increased L  strength by 10-15 lbs.   5. Patient will demonstrate increased L pinch strength (3 positions) by 2 psi's to restore normative fine motor performance.    Plan     Patient is to be treated by Occupational Therapy 2 times per week for 6 weeks, or 12 visits, during the certification period from 5/6/24 to 8/6/24 to achieve the established goals.       Updates/Grading for next session: TBD pending progress/Re-Assessment       ESTER Rai

## 2024-06-04 NOTE — PROGRESS NOTES
"[facility]  Occupational Therapy Progress & Treatment Note     Date: 6/6/2024  Name: Rivka Ivy  Clinic Number: [id number]    Therapy Diagnosis:   Encounter Diagnoses   Name Primary?    Nondisplaced fracture of distal end of left radius Yes    Decreased range of motion of left wrist     Decreased strength of upper extremity     Impaired instrumental activities of daily living (IADL)      Physician: Kaya Givens, *    Physician Orders: Eval and Treat  Medical Diagnosis: S52.502A (ICD-10-CM) - Nondisplaced fracture of distal end of left radius   Date/Mechanism of Injury: FOOSH injury, 3/7/24  Evaluation Date: 5/6/2024  Insurance Authorization Period Expiration: 7/13/24  Plan of Care Certification Period: 5/6/24-8/6/24  Progress Note Due: 6/6/24  Date of Return to MD: 5/29/24  Visit # / Visits authorized: 5/12  FOTO: Initial evaluation/28.3; 6th visit/55.0     Precautions:  Standard; Pt is 11 weeks post-fracture     Time In: 12:22  Time Out: 12:50  Total Billable Time: 28 minutes    Subjective     Pt reports: "I'm really tired today- I didn't sleep well last night. My forearm was so achy! It's like it was numb. I can't think of anything I did yesterday that made it hurt like that."     She was compliant with home exercise program given last session.     Response to previous treatment: N/A    Functional change: Increased (I) with ADLs     Pain: 3/10  Location: left anterior forearm     Objective     Objective Measures updated at progress report unless specified.    Wrist Flex/Ext: 65/60 (+40 /+5)   (R: 55/60)   Wrist RD/UD: 10/35  (same/+5)   (R: 10/40)      Strength (Dyanmometer) and Pinch Strength (Pinch Gauge)  Measured in pounds and psi. Average of three trials.    5/16/2024 5/16/2024 6/6/2024     Right  Left  Left   Rung II  53.7  30.8 35.0   Russo Pinch 18   14 15   3pt Pinch  14  10  10   2pt Pinch  12  8 10     Manual Muscle Testing  Wrist flexion: 4/5  Wrist extension: " 4/5  RD: 4/5  UD: 4/5        CMS Impairment/Limitation/Restriction for FOTO Wrist Survey     Therapist reviewed FOTO scores for Rivka Ivy on 6/6/2024.   FOTO documents entered into [employer] - see Media section.     Limitation Score: 55%  Category: ADLs and IADLs         Treatment     Rivka received the treatments listed below:      -All billed as therapeutic activities due to Medicaid     Re-Assessment performed with measurements and report taken, goals updated, review of home program x 28 minutes      Patient Education and Home Exercises     Education provided:   - Importance of compliance w/ HEP to maximize gains   - Progress towards goals     Written Home Exercises Provided: Rest from Theraputty exercises until next session.   Exercises were reviewed and Rivka was able to demonstrate them prior to the end of the session.  Rivka demonstrated good  understanding of the HEP provided. See EMR under Patient Instructions for exercises provided during previous therapy sessions.       Assessment     Pt would continue to benefit from skilled OT. Pt is making steady progress towards established goals, noting normative wrist ROM demonstrated, as well as gradual increase in  and pinch strength levels. Overall, Pt has reported minimal wrist & forearm pain over the past 2 weeks, except for a bout of pain last evening, which she cannot attribute to anything specific.      Rivka is progressing well towards her goals and there are no updates to goals at this time. Pt prognosis is Good.     Pt will continue to benefit from skilled Outpatient Occupational Therapy to address the deficits listed in the problem list on initial evaluation provide Pt/family education and to maximize Pt's level of independence in the home and community environment.     Pt's spiritual, cultural and educational needs considered and pt agreeable to plan of care and goals.    Anticipated barriers to occupational therapy: None noted     ShortTerm Goals  (to be met in 3 weeks or by 5/27/24 ):   1. Patient to be IND and compliant with HEP & attendance for duration of therapy. Goal Met 6/6  2. Patient will demonstrate increased L wrist ROM within 5-10 degrees or her R wrist ROM to restore functional hand use for ADLs and IADLs. Goal Met 6/6  3. Assess , pinches, and isolated wist/forearm MMT when appropriate and amend LTG. Partially Met 5/16  4. Patient will report no more than 3/10 pain in L wrist and hand. In progress 6/6        Updated Long Term Goals (to be met in 6 weeks or by DC):   1. Patient to be IND and compliant with HEP & attendance for duration of therapy.  2. Patient will report increase in functional use of her L wrist & hand by 10% as evidenced by the FOTO outcome measure.   3. Patient will report no more than 1/10 pain in L wrist & hand.   4. Patient will demonstrate increased L  strength by 10-15 lbs. In progress 6/6  5. Patient will demonstrate increased L pinch strength (3 positions) by 2 psi's to restore normative fine motor performance.In progress 6/6     Plan     Patient is to be treated by Occupational Therapy 2 times per week for 6 weeks, or 12 visits, during the certification period from 5/6/24 to 8/6/24 to achieve the established goals.       Updates/Grading for next session: TBD pending progress      ESTER Rai

## 2024-06-06 ENCOUNTER — CLINICAL SUPPORT (OUTPATIENT)
Dept: REHABILITATION | Facility: HOSPITAL | Age: 64
End: 2024-06-06
Payer: MEDICAID

## 2024-06-06 DIAGNOSIS — R29.898 DECREASED STRENGTH OF UPPER EXTREMITY: ICD-10-CM

## 2024-06-06 DIAGNOSIS — M25.632 DECREASED RANGE OF MOTION OF LEFT WRIST: ICD-10-CM

## 2024-06-06 DIAGNOSIS — S52.502A NONDISPLACED FRACTURE OF DISTAL END OF LEFT RADIUS: Primary | ICD-10-CM

## 2024-06-06 DIAGNOSIS — Z78.9 IMPAIRED INSTRUMENTAL ACTIVITIES OF DAILY LIVING (IADL): ICD-10-CM

## 2024-06-06 PROCEDURE — 97530 THERAPEUTIC ACTIVITIES: CPT | Mod: PN

## 2024-06-10 ENCOUNTER — CLINICAL SUPPORT (OUTPATIENT)
Dept: REHABILITATION | Facility: HOSPITAL | Age: 64
End: 2024-06-10
Payer: MEDICAID

## 2024-06-10 DIAGNOSIS — R29.898 DECREASED STRENGTH OF UPPER EXTREMITY: ICD-10-CM

## 2024-06-10 DIAGNOSIS — Z78.9 IMPAIRED INSTRUMENTAL ACTIVITIES OF DAILY LIVING (IADL): ICD-10-CM

## 2024-06-10 DIAGNOSIS — M25.632 DECREASED RANGE OF MOTION OF LEFT WRIST: ICD-10-CM

## 2024-06-10 DIAGNOSIS — S52.502A NONDISPLACED FRACTURE OF DISTAL END OF LEFT RADIUS: Primary | ICD-10-CM

## 2024-06-10 PROCEDURE — 97530 THERAPEUTIC ACTIVITIES: CPT | Mod: PN

## 2024-06-10 NOTE — PROGRESS NOTES
"[facility]  Occupational Therapy Treatment Note     Date: 6/10/2024  Name: Rivka Ivy  Clinic Number: [id number]    Therapy Diagnosis:   Encounter Diagnoses   Name Primary?    Nondisplaced fracture of distal end of left radius Yes    Decreased range of motion of left wrist     Decreased strength of upper extremity     Impaired instrumental activities of daily living (IADL)      Physician: Kaya Givens, *    Physician Orders: Eval and Treat  Medical Diagnosis: S52.502A (ICD-10-CM) - Nondisplaced fracture of distal end of left radius   Date/Mechanism of Injury: FOOSH injury, 3/7/24  Evaluation Date: 5/6/2024  Insurance Authorization Period Expiration: 7/13/24  Plan of Care Certification Period: 5/6/24-8/6/24  Progress Note Due: 7/6/24  Date of Return to MD: 5/29/24  Visit # / Visits authorized: 6/12  FOTO: Initial evaluation/28.3; 6th visit/55.0     Precautions:  Standard; Pt is 12 weeks post-fracture     Time In: 12:15  Time Out: 12:55  Total Billable Time: 40 minutes    Subjective     Pt reports: "I'm just a little sore today- but much better than Friday."     She was compliant with home exercise program given last session.     Response to previous treatment: favorable    Functional change: Increased (I) with ADLs     Pain: 1/10  Location: left anterior forearm     Objective     Objective Measures updated at progress report unless specified.    Treatment     Rivka received the treatments listed below:      -All billed as therapeutic activities due to Medicaid     Therapeutic exercises & activities to improve functional performance for 32 minutes, including:  -PHG 35# for composite  x 5-7 min   -EDC strengthening (red Digiextend) x 5 min   -Lateral/key pinches (red) x 3 min each  -Ergo-gripper (2 red) x 3 min   -1# free weight for wrist flex/ext/sup/pronation x 2 min each   -Review of activity modifications for current fracture healing timelines        Direct contact " modalities after being cleared for contraindications:   -Utrasound for scar tissue remodeling, increased circulation, & tissue elasticity @ 100% duty cycle, 3.3 Mhz, applied to L dorsal wrist, intensity = 0.6 w/cm2 for 8 minutes. Patient tolerated treatment well without adverse effects. Therapist was in attendance throughout intervention.     Patient Education and Home Exercises     Education provided:   - Importance of compliance w/ HEP to maximize gains   - Progress towards goals     Written Home Exercises Provided: Continue with previous HEP.  Exercises were reviewed and Rivka was able to demonstrate them prior to the end of the session.  Rivka demonstrated good  understanding of the HEP provided. See EMR under Patient Instructions for exercises provided during previous therapy sessions.       Assessment     Pt would continue to benefit from skilled OT. Pt tolerated session well, no increased forearm pain with today's reduced resistive therapeutic exercises.    Rivka is progressing well towards her goals and there are no updates to goals at this time. Pt prognosis is Good.     Pt will continue to benefit from skilled Outpatient Occupational Therapy to address the deficits listed in the problem list on initial evaluation provide Pt/family education and to maximize Pt's level of independence in the home and community environment.     Pt's spiritual, cultural and educational needs considered and pt agreeable to plan of care and goals.    Anticipated barriers to occupational therapy: None noted     ShortTerm Goals (to be met in 3 weeks or by 5/27/24 ):   1. Patient to be IND and compliant with HEP & attendance for duration of therapy. Goal Met 6/6  2. Patient will demonstrate increased L wrist ROM within 5-10 degrees or her R wrist ROM to restore functional hand use for ADLs and IADLs. Goal Met 6/6  3. Assess , pinches, and isolated wist/forearm MMT when appropriate and amend LTG. Partially Met 5/16  4. Patient  will report no more than 3/10 pain in L wrist and hand. In progress 6/6        Updated Long Term Goals (to be met in 6 weeks or by DC):   1. Patient to be IND and compliant with HEP & attendance for duration of therapy.  2. Patient will report increase in functional use of her L wrist & hand by 10% as evidenced by the FOTO outcome measure.   3. Patient will report no more than 1/10 pain in L wrist & hand.   4. Patient will demonstrate increased L  strength by 10-15 lbs. In progress 6/6  5. Patient will demonstrate increased L pinch strength (3 positions) by 2 psi's to restore normative fine motor performance.In progress 6/6     Plan     Patient is to be treated by Occupational Therapy 2 times per week for 6 weeks, or 12 visits, during the certification period from 5/6/24 to 8/6/24 to achieve the established goals.       Updates/Grading for next session: TBD pending progress      ESTER Rai

## 2024-06-13 ENCOUNTER — DOCUMENTATION ONLY (OUTPATIENT)
Dept: REHABILITATION | Facility: HOSPITAL | Age: 64
End: 2024-06-13
Payer: MEDICAID

## 2024-06-13 DIAGNOSIS — R29.898 DECREASED STRENGTH OF UPPER EXTREMITY: ICD-10-CM

## 2024-06-13 DIAGNOSIS — Z78.9 IMPAIRED INSTRUMENTAL ACTIVITIES OF DAILY LIVING (IADL): ICD-10-CM

## 2024-06-13 DIAGNOSIS — M25.632 DECREASED RANGE OF MOTION OF LEFT WRIST: Primary | ICD-10-CM

## 2024-06-13 NOTE — PROGRESS NOTES
Pt contacted the therapy clinic approx. 2 hours prior to her scheduled OT appointment time on 6/12/24 requesting to re-schedule her appointment.     CARMEN Rai/L  [facility]

## 2024-06-17 ENCOUNTER — CLINICAL SUPPORT (OUTPATIENT)
Dept: REHABILITATION | Facility: HOSPITAL | Age: 64
End: 2024-06-17
Payer: MEDICAID

## 2024-06-17 DIAGNOSIS — Z78.9 IMPAIRED INSTRUMENTAL ACTIVITIES OF DAILY LIVING (IADL): ICD-10-CM

## 2024-06-17 DIAGNOSIS — S52.502A NONDISPLACED FRACTURE OF DISTAL END OF LEFT RADIUS: Primary | ICD-10-CM

## 2024-06-17 DIAGNOSIS — R29.898 DECREASED STRENGTH OF UPPER EXTREMITY: ICD-10-CM

## 2024-06-17 DIAGNOSIS — M25.632 DECREASED RANGE OF MOTION OF LEFT WRIST: ICD-10-CM

## 2024-06-17 PROCEDURE — 97530 THERAPEUTIC ACTIVITIES: CPT | Mod: PN

## 2024-06-17 NOTE — PROGRESS NOTES
"[facility]  Occupational Therapy Treatment Note     Date: 6/17/2024  Name: Rivka Ivy  Clinic Number: [id number]    Therapy Diagnosis:   Encounter Diagnoses   Name Primary?    Nondisplaced fracture of distal end of left radius Yes    Decreased range of motion of left wrist     Decreased strength of upper extremity     Impaired instrumental activities of daily living (IADL)      Physician: Kaya Givens, *    Physician Orders: Eval and Treat  Medical Diagnosis: S52.502A (ICD-10-CM) - Nondisplaced fracture of distal end of left radius   Date/Mechanism of Injury: FOOSH injury, 3/7/24  Evaluation Date: 5/6/2024  Insurance Authorization Period Expiration: 7/13/24  Plan of Care Certification Period: 5/6/24-8/6/24  Progress Note Due: 7/6/24  Date of Return to MD: 5/29/24  Visit # / Visits authorized: 7/12  FOTO: Initial evaluation/28.3; 6th visit/55.0     Precautions:  Standard; Pt is 13 weeks post-fracture     Time In: 12:17  Time Out: 13:00  Total Billable Time: 43 minutes    Subjective     Pt reports: "I just have that little bit of numbness in my hand- I hope it goes away at some point."     She was compliant with home exercise program given last session.     Response to previous treatment: favorable    Functional change: Increased (I) with ADLs     Pain: 1/10  Location: left anterior forearm     Objective     Objective Measures updated at progress report unless specified.    Treatment     Rivka received the treatments listed below:      -All billed as therapeutic activities due to Medicaid     Therapeutic exercises & activities to improve functional performance for 43 minutes, including:  -PHG 50# for composite  x 5-7 min   -EDC strengthening (red Digiextend) x 5 min   -Lateral/key pinches (green) x 3 min each  -Ergo-gripper (2 red) x 3 min   -2# free weight for wrist flex/ext/sup/pronation x 2 min each   -Rest breaks as needed     Patient Education and Home Exercises "     Education provided:   - Importance of compliance w/ HEP to maximize gains   - Progress towards goals     Written Home Exercises Provided: Continue with previous HEP.  Exercises were reviewed and Rivka was able to demonstrate them prior to the end of the session.  Rivka demonstrated good  understanding of the HEP provided. See EMR under Patient Instructions for exercises provided during previous therapy sessions.       Assessment     Pt would continue to benefit from skilled OT. Pt tolerated session well, no continued mild L hand paresthesias, intermittent in nature, with no wrist pain reported over the past 5-7 days.     Rivka is progressing well towards her goals and there are no updates to goals at this time. Pt prognosis is Good.     Pt will continue to benefit from skilled Outpatient Occupational Therapy to address the deficits listed in the problem list on initial evaluation provide Pt/family education and to maximize Pt's level of independence in the home and community environment.     Pt's spiritual, cultural and educational needs considered and pt agreeable to plan of care and goals.    Anticipated barriers to occupational therapy: None noted     ShortTerm Goals (to be met in 3 weeks or by 5/27/24 ):   1. Patient to be IND and compliant with HEP & attendance for duration of therapy. Goal Met 6/6  2. Patient will demonstrate increased L wrist ROM within 5-10 degrees or her R wrist ROM to restore functional hand use for ADLs and IADLs. Goal Met 6/6  3. Assess , pinches, and isolated wist/forearm MMT when appropriate and amend LTG. Partially Met 5/16  4. Patient will report no more than 3/10 pain in L wrist and hand. In progress 6/6        Updated Long Term Goals (to be met in 6 weeks or by DC):   1. Patient to be IND and compliant with HEP & attendance for duration of therapy.  2. Patient will report increase in functional use of her L wrist & hand by 10% as evidenced by the FOTO outcome measure.   3.  Patient will report no more than 1/10 pain in L wrist & hand.   4. Patient will demonstrate increased L  strength by 10-15 lbs. In progress 6/6  5. Patient will demonstrate increased L pinch strength (3 positions) by 2 psi's to restore normative fine motor performance.In progress 6/6     Plan     Patient is to be treated by Occupational Therapy 2 times per week for 6 weeks, or 12 visits, during the certification period from 5/6/24 to 8/6/24 to achieve the established goals.       Updates/Grading for next session: TBD pending progress      ESTER Rai

## 2024-06-20 ENCOUNTER — CLINICAL SUPPORT (OUTPATIENT)
Dept: REHABILITATION | Facility: HOSPITAL | Age: 64
End: 2024-06-20
Payer: MEDICAID

## 2024-06-20 DIAGNOSIS — M25.632 DECREASED RANGE OF MOTION OF LEFT WRIST: ICD-10-CM

## 2024-06-20 DIAGNOSIS — S52.502A NONDISPLACED FRACTURE OF DISTAL END OF LEFT RADIUS: Primary | ICD-10-CM

## 2024-06-20 DIAGNOSIS — Z78.9 IMPAIRED INSTRUMENTAL ACTIVITIES OF DAILY LIVING (IADL): ICD-10-CM

## 2024-06-20 DIAGNOSIS — R29.898 DECREASED STRENGTH OF UPPER EXTREMITY: ICD-10-CM

## 2024-06-20 PROCEDURE — 97530 THERAPEUTIC ACTIVITIES: CPT | Mod: PN

## 2024-06-20 NOTE — PROGRESS NOTES
"[facility]  Occupational Therapy Treatment Note     Date: 6/20/2024  Name: Rivka Ivy  Clinic Number: [id number]    Therapy Diagnosis:   Encounter Diagnoses   Name Primary?    Nondisplaced fracture of distal end of left radius Yes    Decreased range of motion of left wrist     Decreased strength of upper extremity     Impaired instrumental activities of daily living (IADL)      Physician: Kaya Givens, *    Physician Orders: Eval and Treat  Medical Diagnosis: S52.502A (ICD-10-CM) - Nondisplaced fracture of distal end of left radius   Date/Mechanism of Injury: FOOSH injury, 3/7/24  Evaluation Date: 5/6/2024  Insurance Authorization Period Expiration: 7/13/24  Plan of Care Certification Period: 5/6/24-8/6/24  Progress Note Due: 7/6/24  Date of Return to MD: 5/29/24  Visit # / Visits authorized: 8/12  FOTO: Initial evaluation/28.3; 6th visit/55.0     Precautions:  Standard; Pt is 13 weeks post-fracture     Time In: 12:22  Time Out: 13:00  Total Billable Time: 38 minutes    Subjective     Pt reports: "It's good today- even the numbness and tingling."     She was compliant with home exercise program given last session.     Response to previous treatment: favorable    Functional change: Increased (I) with ADLs     Pain: 1/10  Location: left anterior forearm     Objective     Objective Measures updated at progress report unless specified.    Treatment     Rivka received the treatments listed below:      -All billed as therapeutic activities due to Medicaid     Therapeutic exercises & activities to improve functional performance for 38 minutes, including:  -PHG 50# for composite  x  5 min   -EDC strengthening (red Digiextend) x 3 min   -Lateral/key pinches (green) x 3 min each  -Ergo-gripper (2 red) x 3 min   -2# free weight for wrist flex & ext x 2 min each   -3# free weight for bicep curls x 3 min   -Rest breaks as needed     Patient Education and Home Exercises     Education " provided:   - Importance of compliance w/ HEP to maximize gains   - Progress towards goals     Written Home Exercises Provided: Continue with previous HEP.  Exercises were reviewed and Rivka was able to demonstrate them prior to the end of the session.  Rivka demonstrated good  understanding of the HEP provided. See EMR under Patient Instructions for exercises provided during previous therapy sessions.       Assessment     Pt would continue to benefit from skilled OT. Pt tolerated session well, noting decreased paresthesias reported since last treatment session.     Rivka is progressing well towards her goals and there are no updates to goals at this time. Pt prognosis is Good.     Pt will continue to benefit from skilled Outpatient Occupational Therapy to address the deficits listed in the problem list on initial evaluation provide Pt/family education and to maximize Pt's level of independence in the home and community environment.     Pt's spiritual, cultural and educational needs considered and pt agreeable to plan of care and goals.    Anticipated barriers to occupational therapy: None noted     ShortTerm Goals (to be met in 3 weeks or by 5/27/24 ):   1. Patient to be IND and compliant with HEP & attendance for duration of therapy. Goal Met 6/6  2. Patient will demonstrate increased L wrist ROM within 5-10 degrees or her R wrist ROM to restore functional hand use for ADLs and IADLs. Goal Met 6/6  3. Assess , pinches, and isolated wist/forearm MMT when appropriate and amend LTG. Partially Met 5/16  4. Patient will report no more than 3/10 pain in L wrist and hand. In progress 6/6        Updated Long Term Goals (to be met in 6 weeks or by DC):   1. Patient to be IND and compliant with HEP & attendance for duration of therapy.  2. Patient will report increase in functional use of her L wrist & hand by 10% as evidenced by the FOTO outcome measure.   3. Patient will report no more than 1/10 pain in L wrist &  hand.   4. Patient will demonstrate increased L  strength by 10-15 lbs. In progress 6/6  5. Patient will demonstrate increased L pinch strength (3 positions) by 2 psi's to restore normative fine motor performance.In progress 6/6     Plan     Patient is to be treated by Occupational Therapy 2 times per week for 6 weeks, or 12 visits, during the certification period from 5/6/24 to 8/6/24 to achieve the established goals.       Updates/Grading for next session: TBD pending progress      ESTER Rai

## 2024-06-24 NOTE — PROGRESS NOTES
"[facility]  Occupational Therapy Treatment Note     Date: 6/25/2024  Name: Rivka Ivy  Clinic Number: [id number]    Therapy Diagnosis:   Encounter Diagnoses   Name Primary?    Nondisplaced fracture of distal end of left radius Yes    Decreased range of motion of left wrist     Decreased strength of upper extremity     Impaired instrumental activities of daily living (IADL)      Physician: Kaya Givens, *    Physician Orders: Eval and Treat  Medical Diagnosis: S52.502A (ICD-10-CM) - Nondisplaced fracture of distal end of left radius   Date/Mechanism of Injury: FOOSH injury, 3/7/24  Evaluation Date: 5/6/2024  Insurance Authorization Period Expiration: 7/13/24  Plan of Care Certification Period: 5/6/24-8/6/24  Progress Note Due: 7/6/24  Date of Return to MD: 5/29/24  Visit # / Visits authorized: 9/12  FOTO: Initial evaluation/28.3; 6th visit/55.0     Precautions:  Standard; Pt is 14 weeks post-fracture     Time In: 14:35  Time Out: 15:15  Total Billable Time: 40 minutes    Subjective     Pt reports: "It's good today- even the numbness and tingling."     She was compliant with home exercise program given last session.     Response to previous treatment: favorable    Functional change: Increased (I) with ADLs     Pain: 1/10  Location: left anterior forearm     Objective     Objective Measures updated at progress report unless specified.    Treatment     Rivka received the treatments listed below:      -All billed as therapeutic activities due to Medicaid     Therapeutic exercises & activities to improve functional performance for 40 minutes, including:  -PHG 50# for composite  x  5 min   -EDC strengthening (red Digiextend) x 3 min   -Lateral/key pinches (green) x 3 min each  -Ergo-gripper (2 red) x 3 min   -2# free weight for wrist flex & ext x 2 min each   -3# free weight for bicep curls x 3 min   -Rest breaks as needed     Patient Education and Home Exercises     Education " provided:   - Importance of compliance w/ HEP to maximize gains   - Progress towards goals     Written Home Exercises Provided: Continue with previous HEP.  Exercises were reviewed and Rivka was able to demonstrate them prior to the end of the session.  Rivka demonstrated good  understanding of the HEP provided. See EMR under Patient Instructions for exercises provided during previous therapy sessions.       Assessment     Pt would continue to benefit from skilled OT. Pt tolerated session well, noting re-assessment scheduled for 6/27/24 with plans to discharge to [facility] at that time.     Rivka is progressing well towards her goals and there are no updates to goals at this time. Pt prognosis is Good.     Pt will continue to benefit from skilled Outpatient Occupational Therapy to address the deficits listed in the problem list on initial evaluation provide Pt/family education and to maximize Pt's level of independence in the home and community environment.     Pt's spiritual, cultural and educational needs considered and pt agreeable to plan of care and goals.    Anticipated barriers to occupational therapy: None noted     ShortTerm Goals (to be met in 3 weeks or by 5/27/24 ):   1. Patient to be IND and compliant with HEP & attendance for duration of therapy. Goal Met 6/6  2. Patient will demonstrate increased L wrist ROM within 5-10 degrees or her R wrist ROM to restore functional hand use for ADLs and IADLs. Goal Met 6/6  3. Assess , pinches, and isolated wist/forearm MMT when appropriate and amend LTG. Partially Met 5/16  4. Patient will report no more than 3/10 pain in L wrist and hand. In progress 6/6        Updated Long Term Goals (to be met in 6 weeks or by DC):   1. Patient to be IND and compliant with HEP & attendance for duration of therapy.  2. Patient will report increase in functional use of her L wrist & hand by 10% as evidenced by the FOTO outcome measure.   3. Patient will report no more than 1/10  pain in L wrist & hand.   4. Patient will demonstrate increased L  strength by 10-15 lbs. In progress 6/6  5. Patient will demonstrate increased L pinch strength (3 positions) by 2 psi's to restore normative fine motor performance.In progress 6/6     Plan     Patient is to be treated by Occupational Therapy 2 times per week for 6 weeks, or 12 visits, during the certification period from 5/6/24 to 8/6/24 to achieve the established goals.       Updates/Grading for next session: Re-Assessment       ESTER Rai

## 2024-06-25 ENCOUNTER — CLINICAL SUPPORT (OUTPATIENT)
Dept: REHABILITATION | Facility: HOSPITAL | Age: 64
End: 2024-06-25
Payer: MEDICAID

## 2024-06-25 DIAGNOSIS — Z78.9 IMPAIRED INSTRUMENTAL ACTIVITIES OF DAILY LIVING (IADL): ICD-10-CM

## 2024-06-25 DIAGNOSIS — R29.898 DECREASED STRENGTH OF UPPER EXTREMITY: ICD-10-CM

## 2024-06-25 DIAGNOSIS — M25.632 DECREASED RANGE OF MOTION OF LEFT WRIST: ICD-10-CM

## 2024-06-25 DIAGNOSIS — S52.502A NONDISPLACED FRACTURE OF DISTAL END OF LEFT RADIUS: Primary | ICD-10-CM

## 2024-06-25 PROCEDURE — 97530 THERAPEUTIC ACTIVITIES: CPT | Mod: PN

## 2024-06-27 ENCOUNTER — CLINICAL SUPPORT (OUTPATIENT)
Dept: REHABILITATION | Facility: HOSPITAL | Age: 64
End: 2024-06-27
Payer: MEDICAID

## 2024-06-27 DIAGNOSIS — Z78.9 IMPAIRED INSTRUMENTAL ACTIVITIES OF DAILY LIVING (IADL): ICD-10-CM

## 2024-06-27 DIAGNOSIS — S52.502A NONDISPLACED FRACTURE OF DISTAL END OF LEFT RADIUS: Primary | ICD-10-CM

## 2024-06-27 DIAGNOSIS — R29.898 DECREASED STRENGTH OF UPPER EXTREMITY: ICD-10-CM

## 2024-06-27 DIAGNOSIS — M25.632 DECREASED RANGE OF MOTION OF LEFT WRIST: ICD-10-CM

## 2024-06-27 PROCEDURE — 97530 THERAPEUTIC ACTIVITIES: CPT | Mod: PN

## 2024-06-27 NOTE — PROGRESS NOTES
"[facility]  Occupational Therapy Progress Note/Discharge Summary     Date: 6/27/2024  Name: Rivka Ivy  Clinic Number: [id number]    Therapy Diagnosis:   Encounter Diagnoses   Name Primary?    Nondisplaced fracture of distal end of left radius Yes    Decreased range of motion of left wrist     Decreased strength of upper extremity     Impaired instrumental activities of daily living (IADL)      Physician: Kaya Givens, *    Physician Orders: Eval and Treat  Medical Diagnosis: S52.502A (ICD-10-CM) - Nondisplaced fracture of distal end of left radius   Date/Mechanism of Injury: FOOSH injury, 3/7/24  Evaluation Date: 5/6/2024  Insurance Authorization Period Expiration: 7/13/24  Plan of Care Certification Period: 5/6/24-8/6/24  Progress Note Due: 7/6/24  Date of Return to MD: 5/29/24  Visit # / Visits authorized: 10/12  FOTO: Initial evaluation/28.3; 6th visit/55.0; 11th visit/11.7     Precautions:  Standard; Pt is 14 weeks post-fracture     Time In: 13:52  Time Out: 14:30  Total Billable Time: 38 minutes    Subjective     Pt reports: "The only thing I still have trouble with is opening really tight jars."    She was compliant with home exercise program given last session.     Response to previous treatment: favorable    Functional change: Increased (I) with ADLs     Pain: 1/10  Location: left anterior forearm     Objective     Objective Measures updated at progress report unless specified.    Wrist Flex/Ext: 75/60  (R: 55/60)   Wrist RD/UD: 20/35   (R: 10/40)      Strength (Dyanmometer) and Pinch Strength (Pinch Gauge)  Measured in pounds and psi. Average of three trials.    5/16/2024 5/16/2024 6/6/2024 6/27/2024     Right  Left  Left Left   Rung II  53.7  30.8 35.0 47.4   Russo Pinch 18   14 15 16   3pt Pinch  14  10  10 12   2pt Pinch  12  8 10 10     Manual Muscle Testing  Wrist flexion: 5/5  Wrist extension: 5/5  RD: 5/5  UD: 5/5        CMS Impairment/Limitation/Restriction " for FOTO Wrist Survey     Therapist reviewed FOTO scores for Rivka Ivy on 6/27/2024.   FOTO documents entered into [employer] - see Media section.     Limitation Score: 11.7%  Category: ADLs and IADLs          Treatment     Rivka received the treatments listed below:      -All billed as therapeutic activities due to Medicaid     Re-Assessment performed with measurements and report taken, goals updated, review and amendment of HEP x 38 minutes        Patient Education and Home Exercises     Education provided:   - Importance of compliance w/ HEP to maximize gains   - Progress towards goals     Written Home Exercises Provided: Continue with previous HEP. Focus on progressive loading of wrist with 2#-5# weights to simulate work-related tasks.   Exercises were reviewed and Rivka was able to demonstrate them prior to the end of the session.  Rivka demonstrated good  understanding of the HEP provided. See EMR under Patient Instructions for exercises provided during previous therapy sessions.       Assessment     Pt has met all goals established for therapy, noting normative wrist ROM,  and functional pinches, minimal to no wrist and hand pain, and resolution of hand paresthesias. Pt reports overall restoration of performance with ADLs and IADLs. 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      Pt's spiritual, cultural and educational needs considered and pt agreeable to plan of care and goals.    ShortTerm Goals (to be met in 3 weeks or by 5/27/24 ):   1. Patient to be IND and compliant with HEP & attendance for duration of therapy. Goal Met 6/6  2. Patient will demonstrate increased L wrist ROM within 5-10 degrees or her R wrist ROM to restore functional hand use for ADLs and IADLs. Goal Met 6/6  3. Assess , pinches, and isolated wist/forearm MMT when appropriate and  amend LTG. Partially Met 5/16  4. Patient will report no more than 3/10 pain in L wrist and hand. In progress 6/6        Updated Long Term Goals (to be met in 6 weeks or by DC):   1. Patient to be IND and compliant with HEP & attendance for duration of therapy. Goal Met 6/27  2. Patient will report increase in functional use of her L wrist & hand by 10% as evidenced by the FOTO outcome measure. Goal Met 6/27  3. Patient will report no more than 1/10 pain in L wrist & hand. Goal Met 6/27  4. Patient will demonstrate increased L  strength by 10-15 lbs. Goal Met 6/27  5. Patient will demonstrate increased L pinch strength (3 positions) by 2 psi's to restore normative fine motor performance. Goal Met 6/27     Plan     Patient is appropriate for discharge from Outpatient Occupational Therapy at this time and will continue at home with HEP.        ESTER Rai

## 2024-08-30 ENCOUNTER — OCCUPATIONAL HEALTH (OUTPATIENT)
Dept: URGENT CARE | Facility: CLINIC | Age: 64
End: 2024-08-30

## 2024-08-30 ENCOUNTER — TELEPHONE (OUTPATIENT)
Dept: URGENT CARE | Facility: CLINIC | Age: 64
End: 2024-08-30
Payer: MEDICAID

## 2024-08-30 DIAGNOSIS — Z13.9 ENCOUNTER FOR SCREENING: Primary | ICD-10-CM

## 2024-08-30 NOTE — TELEPHONE ENCOUNTER
I called Ms. Ivy to tell her that her TB chest x-ray results are in and that she it welcome to come  a copy of it. She requested that I email it to her so that she is able to send it to her supervisor and for a physical copy. I verified her email address and assured her I will get it sent over and leave a physical copy.  SW